# Patient Record
Sex: MALE | Race: WHITE | HISPANIC OR LATINO | Employment: STUDENT | URBAN - METROPOLITAN AREA
[De-identification: names, ages, dates, MRNs, and addresses within clinical notes are randomized per-mention and may not be internally consistent; named-entity substitution may affect disease eponyms.]

---
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
## 2022-02-14 ENCOUNTER — OFFICE VISIT (OUTPATIENT)
Dept: INTERNAL MEDICINE | Facility: CLINIC | Age: 22
End: 2022-02-14
Payer: COMMERCIAL

## 2022-02-14 ENCOUNTER — LAB VISIT (OUTPATIENT)
Dept: LAB | Facility: HOSPITAL | Age: 22
End: 2022-02-14
Attending: INTERNAL MEDICINE
Payer: COMMERCIAL

## 2022-02-14 VITALS
SYSTOLIC BLOOD PRESSURE: 123 MMHG | BODY MASS INDEX: 20.54 KG/M2 | WEIGHT: 115.94 LBS | HEIGHT: 63 IN | OXYGEN SATURATION: 99 % | HEART RATE: 78 BPM | DIASTOLIC BLOOD PRESSURE: 69 MMHG

## 2022-02-14 DIAGNOSIS — M25.60 JOINT STIFFNESS: ICD-10-CM

## 2022-02-14 DIAGNOSIS — M79.18 MYOFASCIAL PAIN SYNDROME, CERVICAL: Primary | ICD-10-CM

## 2022-02-14 LAB
ALBUMIN SERPL BCP-MCNC: 5.3 G/DL (ref 3.5–5.2)
ALP SERPL-CCNC: 87 U/L (ref 55–135)
ALT SERPL W/O P-5'-P-CCNC: 22 U/L (ref 10–44)
ANION GAP SERPL CALC-SCNC: 11 MMOL/L (ref 8–16)
AST SERPL-CCNC: 26 U/L (ref 10–40)
BASOPHILS # BLD AUTO: 0.04 K/UL (ref 0–0.2)
BASOPHILS NFR BLD: 0.5 % (ref 0–1.9)
BILIRUB SERPL-MCNC: 0.8 MG/DL (ref 0.1–1)
BILIRUB UR QL STRIP: NEGATIVE
BUN SERPL-MCNC: 12 MG/DL (ref 6–20)
CALCIUM SERPL-MCNC: 10.5 MG/DL (ref 8.7–10.5)
CCP AB SER IA-ACNC: <0.5 U/ML
CHLORIDE SERPL-SCNC: 102 MMOL/L (ref 95–110)
CLARITY UR REFRACT.AUTO: CLEAR
CO2 SERPL-SCNC: 28 MMOL/L (ref 23–29)
COLOR UR AUTO: COLORLESS
CREAT SERPL-MCNC: 0.9 MG/DL (ref 0.5–1.4)
CREAT UR-MCNC: 11 MG/DL (ref 23–375)
CRP SERPL-MCNC: <0.3 MG/L (ref 0–8.2)
DIFFERENTIAL METHOD: NORMAL
EOSINOPHIL # BLD AUTO: 0.2 K/UL (ref 0–0.5)
EOSINOPHIL NFR BLD: 2 % (ref 0–8)
ERYTHROCYTE [DISTWIDTH] IN BLOOD BY AUTOMATED COUNT: 13.1 % (ref 11.5–14.5)
ERYTHROCYTE [SEDIMENTATION RATE] IN BLOOD BY WESTERGREN METHOD: <2 MM/HR (ref 0–23)
EST. GFR  (AFRICAN AMERICAN): >60 ML/MIN/1.73 M^2
EST. GFR  (NON AFRICAN AMERICAN): >60 ML/MIN/1.73 M^2
GLUCOSE SERPL-MCNC: 81 MG/DL (ref 70–110)
GLUCOSE UR QL STRIP: NEGATIVE
HCT VFR BLD AUTO: 47.8 % (ref 40–54)
HGB BLD-MCNC: 15.5 G/DL (ref 14–18)
HGB UR QL STRIP: NEGATIVE
IMM GRANULOCYTES # BLD AUTO: 0.01 K/UL (ref 0–0.04)
IMM GRANULOCYTES NFR BLD AUTO: 0.1 % (ref 0–0.5)
KETONES UR QL STRIP: NEGATIVE
LEUKOCYTE ESTERASE UR QL STRIP: NEGATIVE
LYMPHOCYTES # BLD AUTO: 2.9 K/UL (ref 1–4.8)
LYMPHOCYTES NFR BLD: 36.5 % (ref 18–48)
MCH RBC QN AUTO: 28.8 PG (ref 27–31)
MCHC RBC AUTO-ENTMCNC: 32.4 G/DL (ref 32–36)
MCV RBC AUTO: 89 FL (ref 82–98)
MONOCYTES # BLD AUTO: 0.5 K/UL (ref 0.3–1)
MONOCYTES NFR BLD: 5.9 % (ref 4–15)
NEUTROPHILS # BLD AUTO: 4.3 K/UL (ref 1.8–7.7)
NEUTROPHILS NFR BLD: 55 % (ref 38–73)
NITRITE UR QL STRIP: NEGATIVE
NRBC BLD-RTO: 0 /100 WBC
PH UR STRIP: 7 [PH] (ref 5–8)
PLATELET # BLD AUTO: 262 K/UL (ref 150–450)
PMV BLD AUTO: 11 FL (ref 9.2–12.9)
POTASSIUM SERPL-SCNC: 4.1 MMOL/L (ref 3.5–5.1)
PROT SERPL-MCNC: 8.3 G/DL (ref 6–8.4)
PROT UR QL STRIP: NEGATIVE
PROT UR-MCNC: <7 MG/DL (ref 0–15)
PROT/CREAT UR: ABNORMAL MG/G{CREAT} (ref 0–0.2)
RBC # BLD AUTO: 5.39 M/UL (ref 4.6–6.2)
RHEUMATOID FACT SERPL-ACNC: <13 IU/ML (ref 0–15)
SODIUM SERPL-SCNC: 141 MMOL/L (ref 136–145)
SP GR UR STRIP: 1 (ref 1–1.03)
URN SPEC COLLECT METH UR: ABNORMAL
WBC # BLD AUTO: 7.81 K/UL (ref 3.9–12.7)

## 2022-02-14 PROCEDURE — 3074F PR MOST RECENT SYSTOLIC BLOOD PRESSURE < 130 MM HG: ICD-10-PCS | Mod: CPTII,S$GLB,, | Performed by: INTERNAL MEDICINE

## 2022-02-14 PROCEDURE — 80053 COMPREHEN METABOLIC PANEL: CPT | Performed by: INTERNAL MEDICINE

## 2022-02-14 PROCEDURE — 3078F DIAST BP <80 MM HG: CPT | Mod: CPTII,S$GLB,, | Performed by: INTERNAL MEDICINE

## 2022-02-14 PROCEDURE — 1160F PR REVIEW ALL MEDS BY PRESCRIBER/CLIN PHARMACIST DOCUMENTED: ICD-10-PCS | Mod: CPTII,S$GLB,, | Performed by: INTERNAL MEDICINE

## 2022-02-14 PROCEDURE — 3008F BODY MASS INDEX DOCD: CPT | Mod: CPTII,S$GLB,, | Performed by: INTERNAL MEDICINE

## 2022-02-14 PROCEDURE — 1159F MED LIST DOCD IN RCRD: CPT | Mod: CPTII,S$GLB,, | Performed by: INTERNAL MEDICINE

## 2022-02-14 PROCEDURE — 86140 C-REACTIVE PROTEIN: CPT | Performed by: INTERNAL MEDICINE

## 2022-02-14 PROCEDURE — 3078F PR MOST RECENT DIASTOLIC BLOOD PRESSURE < 80 MM HG: ICD-10-PCS | Mod: CPTII,S$GLB,, | Performed by: INTERNAL MEDICINE

## 2022-02-14 PROCEDURE — 84156 ASSAY OF PROTEIN URINE: CPT | Performed by: INTERNAL MEDICINE

## 2022-02-14 PROCEDURE — 86038 ANTINUCLEAR ANTIBODIES: CPT | Performed by: INTERNAL MEDICINE

## 2022-02-14 PROCEDURE — 3008F PR BODY MASS INDEX (BMI) DOCUMENTED: ICD-10-PCS | Mod: CPTII,S$GLB,, | Performed by: INTERNAL MEDICINE

## 2022-02-14 PROCEDURE — 99214 OFFICE O/P EST MOD 30 MIN: CPT | Mod: S$GLB,,, | Performed by: INTERNAL MEDICINE

## 2022-02-14 PROCEDURE — 99999 PR PBB SHADOW E&M-EST. PATIENT-LVL III: ICD-10-PCS | Mod: PBBFAC,,, | Performed by: INTERNAL MEDICINE

## 2022-02-14 PROCEDURE — 85025 COMPLETE CBC W/AUTO DIFF WBC: CPT | Performed by: INTERNAL MEDICINE

## 2022-02-14 PROCEDURE — 99214 PR OFFICE/OUTPT VISIT, EST, LEVL IV, 30-39 MIN: ICD-10-PCS | Mod: S$GLB,,, | Performed by: INTERNAL MEDICINE

## 2022-02-14 PROCEDURE — 85652 RBC SED RATE AUTOMATED: CPT | Performed by: INTERNAL MEDICINE

## 2022-02-14 PROCEDURE — 36415 COLL VENOUS BLD VENIPUNCTURE: CPT | Performed by: INTERNAL MEDICINE

## 2022-02-14 PROCEDURE — 81003 URINALYSIS AUTO W/O SCOPE: CPT | Performed by: INTERNAL MEDICINE

## 2022-02-14 PROCEDURE — 86803 HEPATITIS C AB TEST: CPT | Performed by: INTERNAL MEDICINE

## 2022-02-14 PROCEDURE — 1160F RVW MEDS BY RX/DR IN RCRD: CPT | Mod: CPTII,S$GLB,, | Performed by: INTERNAL MEDICINE

## 2022-02-14 PROCEDURE — 86200 CCP ANTIBODY: CPT | Performed by: INTERNAL MEDICINE

## 2022-02-14 PROCEDURE — 86431 RHEUMATOID FACTOR QUANT: CPT | Performed by: INTERNAL MEDICINE

## 2022-02-14 PROCEDURE — 86235 NUCLEAR ANTIGEN ANTIBODY: CPT | Performed by: INTERNAL MEDICINE

## 2022-02-14 PROCEDURE — 3074F SYST BP LT 130 MM HG: CPT | Mod: CPTII,S$GLB,, | Performed by: INTERNAL MEDICINE

## 2022-02-14 PROCEDURE — 99999 PR PBB SHADOW E&M-EST. PATIENT-LVL III: CPT | Mod: PBBFAC,,, | Performed by: INTERNAL MEDICINE

## 2022-02-14 PROCEDURE — 1159F PR MEDICATION LIST DOCUMENTED IN MEDICAL RECORD: ICD-10-PCS | Mod: CPTII,S$GLB,, | Performed by: INTERNAL MEDICINE

## 2022-02-14 NOTE — PROGRESS NOTES
CHIEF COMPLAINT     Chief Complaint   Patient presents with    Joint Pain       HPI     Blayne Dai is a 21 y.o. male history bipolar 1 mixed here today for follow-up joint stiffness and pain.    Patient was seen by me back in November reports some myofascial neck pain.  Patient was given home exercises.  Since then reports pain is persisted.  Also notices pain and stiffness in other joints.  Notices that the stiffness is worse 1st thing in the morning and loosens up within half an hour.  Has also noticed some improvement with NSAIDs.    Has had symptoms every single day since he saw me last.  No symptoms like this in the past.    Denies any hair loss scalp scalp tenderness, jaw claudication rashes oral lesions nausea vomiting diarrhea    Answers for HPI/ROS submitted by the patient on 2/11/2022  Chronicity: recurrent  Onset: more than 1 year ago  Frequency: 2 to 4 times per day  Progression since onset: waxing and waning  Pain location: lumbar spine, sacro-iliac  Pain quality: aching, shooting  Radiates to: does not radiate  Pain - numeric: 6/10  Pain is: worse during the day  Aggravated by: bending, sitting, stress, twisting  Stiffness is present: all day  bladder incontinence: No  bowel incontinence: No  leg pain: No  paresis: No  paresthesias: No  pelvic pain: Yes  perianal numbness: No  tingling: No  weight loss: No  genital pain: No  Pain severity: moderate  Improvement on treatment: no relief    Personally Reviewed Patient's Medical, surgical, family and social hx. Changes updated in [facility].  Care Team updated in Epic    Review of Systems:  Review of Systems   Constitutional: Negative for fever.   Cardiovascular: Negative for chest pain.   Gastrointestinal: Positive for abdominal pain.   Genitourinary: Negative for dysuria and hematuria.   Musculoskeletal: Positive for back pain.   Neurological: Positive for weakness. Negative for numbness and headaches.       Health Maintenance:   Reviewed with  "patient  Due for the following:      PHYSICAL EXAM     /69 (BP Location: Right arm, Patient Position: Sitting, BP Method: Medium (Automatic))   Pulse 78   Ht 5' 3" (1.6 m)   Wt 52.6 kg (115 lb 15.4 oz)   SpO2 99%   BMI 20.54 kg/m²     Gen: Well Appearing, NAD  HEENT: PERR, EOMI  Neck: FROM, no thyromegaly, no cervical adenopathy  CVD: RRR, no M/R/G  Pulm: Normal work of breathing, CTAB, no wheezing  Abd:  Soft, NT, ND non TTP, no mass  MSK: no LE edema  Tender palpation cervical muscles  Joint tenderness not reproducible on exam  Neuro: A&Ox3, gait normal, speech normal  Mood; Mood normal, behavior normal, thought process linear       LABS     Labs reviewed; Notable for    ASSESSMENT     1. Myofascial pain syndrome, cervical  Ambulatory referral/consult to Physical/Occupational Therapy   2. Joint stiffness  CBC auto differential    Comprehensive metabolic panel    Sedimentation rate    C-reactive protein    URSZULA    Rheumatoid factor    Cyclic citrul peptide antibody, IgG    Sjogrens syndrome-A extractable nuclear antibody    Urinalysis    Protein / creatinine ratio, urine    Hepatitis C antibody           Plan     Blayne Dai is a 21 y.o. male with history of bipolar 1 mixed here for 2nd visit for muscle pain and joint stiffness.  Correa bleed secondary workup for autoimmune causes.  Will also send to physical therapy.  For physical treatment.  Can take NSAIDs as needed for symptomatic relief.  1. Myofascial pain syndrome, cervical  - Ambulatory referral/consult to Physical/Occupational Therapy; Future    2. Joint stiffness  - CBC auto differential; Future  - Comprehensive metabolic panel; Future  - Sedimentation rate; Future  - C-reactive protein; Future  - URSZULA; Future  - Rheumatoid factor; Future  - Cyclic citrul peptide antibody, IgG; Future  - Sjogrens syndrome-A extractable nuclear antibody; Future  - Urinalysis  - Protein / creatinine ratio, urine  - Hepatitis C antibody; Future      Pietro CLARKE" MD [facility]

## 2022-02-14 NOTE — LETTER
February 14, 2022    Blayne Dai  [address] [facility] [address]             [facility]  [address]  [facility] [address]  Phone: [phone]  Fax: [phone] To Whom This May Concern,    Mr. Blayne Dai was seen and assessed by me on 2/14/2022    Please excuse his from school on 2/14/2022. he may return to school on 02/14/2022    Sincerely,        Pietro Stinson MD  Internal Medicine

## 2022-02-15 LAB
ANA SER QL IF: NORMAL
ANTI-SSA ANTIBODY: 0.07 RATIO (ref 0–0.99)
ANTI-SSA INTERPRETATION: NEGATIVE

## 2022-02-16 LAB — HCV AB SERPL QL IA: NEGATIVE

## 2022-03-09 ENCOUNTER — CLINICAL SUPPORT (OUTPATIENT)
Dept: REHABILITATION | Facility: OTHER | Age: 22
End: 2022-03-09
Attending: INTERNAL MEDICINE
Payer: COMMERCIAL

## 2022-03-09 DIAGNOSIS — G89.29 CHRONIC NECK PAIN: ICD-10-CM

## 2022-03-09 DIAGNOSIS — M79.18 MYOFASCIAL PAIN SYNDROME, CERVICAL: ICD-10-CM

## 2022-03-09 DIAGNOSIS — M54.2 CHRONIC NECK PAIN: ICD-10-CM

## 2022-03-09 DIAGNOSIS — M62.81 MUSCLE WEAKNESS OF RIGHT UPPER EXTREMITY: ICD-10-CM

## 2022-03-09 DIAGNOSIS — R29.3 POSTURAL IMBALANCE: ICD-10-CM

## 2022-03-09 PROCEDURE — 97161 PT EVAL LOW COMPLEX 20 MIN: CPT | Mod: PN

## 2022-03-09 NOTE — PATIENT INSTRUCTIONS
TOWEL ROLL PEC STRETCH          Lie down on a towel roll and hold one of your wrists as it rests on your stomach.     Next, allow your shoulders to lower toward the floor as shown.    Copyright © 2022 [employer]      PRONE RETRACTION        Lying face down with your arms by your side, slowly squeeze your shoulder blades downward and towards your spine.     Hold for  3  Seconds. Perform  20  reps    Copyright © 2022 [employer]    PRONE RETRACTION EXTENSION - PRONE I          Lying face down with your arms by your side, slowly move your arms upward towards the ceiling as you squeeze your shoulder blades downwards and towards your spine.     Hold for  3  Seconds. Perform 2 sets of  10  reps    Copyright © 2022 [employer]    PRONE W        Lying face down with your elbows bent and palms facing downward, slowly raise your arms up towards the ceiling as you squeeze your shoulder blades downward and towards your spine.     Hold for  3  Seconds. Perform 2 sets of  10  reps    Copyright © 2022 [employer]      PRONE Y        Lying face down with your arms stretched out upwards as shown, slowly move your arms upward towards the ceiling as you squeeze your shoulder blades downward and towards your spine.     Hold for  3  Seconds. Perform 2 sets of  10  reps    Copyright © 2022 [employer]      Prone Ts        Start Position: arms out to your sides (like an airplane wing) Palms facing down.    End position: squeeze your shoulder blades together and raise your arms up     Hold for  3  Seconds. Perform 2 sets of  10  Reps.    Copyright © 2022 [employer]

## 2022-03-10 PROBLEM — R29.3 POSTURAL IMBALANCE: Status: ACTIVE | Noted: 2022-03-10

## 2022-03-10 PROBLEM — M62.81 MUSCLE WEAKNESS OF RIGHT UPPER EXTREMITY: Status: ACTIVE | Noted: 2022-03-10

## 2022-03-10 PROBLEM — G89.29 CHRONIC NECK PAIN: Status: ACTIVE | Noted: 2022-03-10

## 2022-03-10 PROBLEM — M54.2 CHRONIC NECK PAIN: Status: ACTIVE | Noted: 2022-03-10

## 2022-03-10 NOTE — PLAN OF CARE
[facility]  Physical Therapy Initial Evaluation    Name: Blayne Dai  Clinic Number: [id number]    Therapy Diagnosis:   Encounter Diagnoses   Name Primary?    Myofascial pain syndrome, cervical     Chronic neck pain     Postural imbalance     Muscle weakness of right upper extremity      Physician: Pietro Stinson MD    Physician Orders: PT Eval and Treat   Medical Diagnosis: M79.18 (ICD-10-CM) - Myofascial pain syndrome, cervical  Evaluation Date: 3/9/2022  Authorization Period Expiration: 2/14/2023  Plan of Care Certification Period: 5/4/2022  Visit # / Visits authorized: 1/ 1    Time In: 4:20 PM  Time Out: 4:58 PM  Total Billable Time: 13 minutes    Precautions: Standard      Subjective   Date of onset: September 2021  History of current condition - Asad reports: having generalized body stiffness and pain, predominantly in his neck, but also along his back and R knee. Is a student at [employer]. Reports when he does his homework, he slouches at a coffee table.       Past Medical History:   Diagnosis Date    Congenital velopharyngeal insufficiency     Growth hormone deficiency     Otitis media     Submucous cleft palate      Blayne Dai  has a past surgical history that includes Tympanostomy tube placement; polyp removal; submucous cleft palate; Pharyngeal flap; and Coccyx fracture surgery (2017).    Blayne has a current medication list which includes the following prescription(s): aripiprazole.    Review of patient's allergies indicates:  No Known Allergies     Imaging: no recent imaging in EPIC    Prior Therapy: none  Social History: lives with their family  Occupation: student. [employer]  Prior Level of Function: I with amb and ADL  Current Level of Function: I with amb and ADL    Pain:  Current 4/10, worst 7/10, best 2/10   Location: bilateral neck. L > R   Description: stiff, electric, and stabbing  Aggravating Factors: sitting, turning his head.  Easing Factors:  standing, Ibuprofen    Radicular symptoms: none    Sleep is not disturbed. Sleeping position: back    Patients structured exercise routine:  Has stopped weight training recently  Exercise routine prior to onset: weight training    Pts goals: increase flexibility, decrease pain    Objective     Postural examination in standing:  - normal lumbar lordosis  - forward head  - forward shoulders  - increased thoracic kyphosis  - protracted scapulae    Postural examination in sitting:   - decreased lumbar lordosis  - forward head  - forward shoulders  - increased thoracic kyphosis  - protracted scapulae      Functional assessment:   - walking: I  - sit to stand: I  - sit to supine: I       - supine to sit: I  - supine to prone: I    Cervical AROM    flexion 35 deg   extension 30 deg   R rotation 60 deg   L rotation 40 deg   R side bending 20 deg   L side bending 10 deg       Cervical Special Tests    Compression negative   Distraction negative   Alar Ligament Stress Test: negative   Sharp-Nishant Test negative   Spurling's:    negative       UE MMT R L   C3 Cervical side bend 5/5 5/5   C4 Shoulder Shrug 5/5  5/5   Shoulder flexion 5/5 5/5   Shoulder abduction 5/5 5/5   Shoulder IR 5/5 5/5   Shoulder ER 5/5 5/5   C5 Elbow flexion 5/5 5/5   C7 Elbow extension 5/5 5/5   C6 Wrist extension 5/5 5/5   Wrist flexion 5/5 5/5   Scapular protraction 5/5 5/5   Mid Traps 4-/5 4-/5   Lower traps 3-/5 3-/5     AROM  LE MMT  R  L    Hip flexion  5/5  5/5    Hip abduction  4-/5  4-/5    Hip extension  3+/5  4/5    Hip ER  5/5  5/5    Hip IR  3+/5  5/5    Knee extension  5/5  5/5    Knee flexion  5/5  5/5    Ankle dorsiflexion  5/5  5/5    Ankle plantar flexion  5/5  5/5    Ankle inversion  5/5  5/5    Ankle eversion  5/5  5/5        Flexibility testing:  - hamstrings:           B: tight. R: -40 deg, L: -40 deg  - gastrocnemius:     B tight, R: neutral, L: neutral   - piriformis:              B: tight, R: decreased 25%, L: decreased 50%  -  "quadriceps:           B: WNL  - hip adductors:       B: WNL  - hip flexors:             B: WNL  - IT bands:               R: tight, decreased 25%, L: WNL    Joint Mobility:  R knee AROM: 0 to 135 deg  L knee AROM: 0 to 140 deg      CMS Impairment/Limitation/Restriction for FOTO Neck Survey    Therapist reviewed FOTO scores for Blayne Dai on 3/9/2022.   FOTO documents entered into "[employer]" - see Media section.    Limitation Score: 45%  Category: Body Position    Current : CK = at least 40% but < 60% impaired, limited or restricted  Goal: CJ = at least 20% but < 40% impaired, limited or restricted       TREATMENT   Treatment Time In: 4:45 pm  Treatment Time Out: 4:58 pm  Total Treatment time separate from Evaluation time: 13 minutes    Asad received therapeutic exercises to develop strength, ROM, flexibility and posture for 13 minutes including:  Prone scap retractions 20 x 5"  Prone Ys 10 x 3"  Prone Ws 10 x 3"  Prone Ts 10 x 3"  Prone Is 10 x 3"  pec stretch on towel roll x 2'      Home Exercises Provided and Patient Education Provided     Education provided:   - Discussed the role of the PTA on the Rehab Team. Discussed patient will be seen by a physical therapist minimally every 6th visit or every 30 days prior to being seen by PTA. Also discussed the use of the [facility] for communication.    Prone scap retract  Prone Ys  Prone Ws  Prone Ts  Prone Is  pec stretch on towel roll    Written Home Exercises Provided: yes.  Exercises were reviewed and Asad was able to demonstrate them prior to the end of the session.  Asad demonstrated good  understanding of the education provided.     See EMR under Patient Instructions for exercises provided 3/9/2022.    Assessment   Blayne is a 21 y.o. male referred to outpatient Physical Therapy with a medical diagnosis of M79.18 (ICD-10-CM) - Myofascial pain syndrome, cervical. Pt presents with poor posture, postural imbalance, weak scapular stabilizers, decreased " B UE strength, decreased B LE strength and flexibility, and weak core contributing to increased upper back and neck pain. Patient also experiencing R knee pain. Patient will benefit from OP PT for postural reeducation, core strengthening, scapular stabilization, and neck stretches and strengthening to progress to below listed goals. WIll initially begin once a week secondary schooling, but may be able to continue twice a week.    Pt prognosis is Good.   Pt will benefit from skilled outpatient Physical Therapy to address the deficits stated above and in the chart below, provide pt/family education, and to maximize pt's level of independence.     Plan of care discussed with patient: Yes  Pt's spiritual, cultural and educational needs considered and patient is agreeable to the plan of care and goals as stated below:     Anticipated Barriers for therapy: school schedule    Medical Necessity is demonstrated by the following  History  Co-morbidities and personal factors that may impact the plan of care Co-morbidities:   none    Personal Factors:   no deficits     low   Examination  Body Structures and Functions, activity limitations and participation restrictions that may impact the plan of care Body Regions:   neck  back  lower extremities  upper extremities    Body Systems:    strength    Participation Restrictions:   none    Activity limitations:   Learning and applying knowledge  no deficits    General Tasks and Commands  no deficits    Communication  no deficits    Mobility  no deficits    Self care  no deficits    Domestic Life  no deficits    Interactions/Relationships  no deficits    Life Areas  no deficits    Community and Social Life  no deficits         moderate   Clinical Presentation stable and uncomplicated low   Decision Making/ Complexity Score: low     Goals:  Short Term Goals: 4 weeks   1. Independent with HEP.  2. Report decreased cervical pain < or =  5/10 with adls such as prolonged sitting, turning  his head.  3. Increased MMT for B UE by 1 muscle grade to promote proper scapular stabilization to decrease cervical pain < or =  5/10 with adls such as prolonged sitting, turning his head.    Long Term Goals: 8 weeks   4. Report decreased cervical pain < or =  3/10 with adls such as prolonged sitting, turning his head.  5. Increased MMT for B UE by 1 muscle grade to promote proper scapular stabilization to decrease cervical pain < or =  3/10 with adls such as prolonged sitting, turning his head.  6. Patient to achieve CJ (at least 20% < 40% impaired, limited or restricted) level on the FOTO Outcomes Measurement System.    Plan   Certification Period/Plan of care expiration: 3/9/2022 to 5/4/2022.    Outpatient Physical Therapy 2 times weekly for 8 weeks to include the following interventions: Manual Therapy, Moist Heat/ Ice, Neuromuscular Re-ed, Patient Education, Therapeutic Activities and Therapeutic Exercise.     Micah Martinez, PT

## 2022-03-18 ENCOUNTER — CLINICAL SUPPORT (OUTPATIENT)
Dept: REHABILITATION | Facility: OTHER | Age: 22
End: 2022-03-18
Payer: COMMERCIAL

## 2022-03-18 DIAGNOSIS — R29.3 POSTURAL IMBALANCE: ICD-10-CM

## 2022-03-18 DIAGNOSIS — M62.81 MUSCLE WEAKNESS OF RIGHT UPPER EXTREMITY: ICD-10-CM

## 2022-03-18 DIAGNOSIS — G89.29 CHRONIC NECK PAIN: Primary | ICD-10-CM

## 2022-03-18 DIAGNOSIS — M54.2 CHRONIC NECK PAIN: Primary | ICD-10-CM

## 2022-03-18 PROCEDURE — 97110 THERAPEUTIC EXERCISES: CPT | Mod: PN,CQ

## 2022-03-18 PROCEDURE — 97140 MANUAL THERAPY 1/> REGIONS: CPT | Mod: PN,CQ

## 2022-03-18 NOTE — PROGRESS NOTES
"                            Physical Therapy Daily Treatment Note     Name: Blayne Dai  Clinic Number: [id number]    Therapy Diagnosis:   Encounter Diagnoses   Name Primary?    Chronic neck pain Yes    Postural imbalance     Muscle weakness of right upper extremity      Physician: Pietro Stinson MD    Visit Date: 3/18/2022  Physician Orders: PT Eval and Treat   Medical Diagnosis: M79.18 (ICD-10-CM) - Myofascial pain syndrome, cervical  Evaluation Date: 3/9/2022  Authorization Period Expiration: 2/14/2023  Plan of Care Certification Period: 5/4/2022  Visit # / Visits authorized: 2 (1/ 20)   PTA Visit:  1/5  FOTO: 3/9/2022  1/5  Time In: 0745  Time Out: 0830  Total Billable Time: 45 minutes     Precautions: Standard       Subjective    Pt states cont to have stiffness and pn in cervcial region.  Pt mentioned having increased B knee pn.  Pt reports that knee pn has occurred for about a year.    Pt reports: he was compliant with home exercise program given last session.   Response to previous treatment:no adverse effects  Functional change: no change     Pain: 0/10  Location: bilateral neck      Objective     Asad received therapeutic exercises to develop strength, endurance, ROM, flexibility, posture and core stabilization for 30 minutes including:  Prone scap retractions 30 x 5"  Prone Ys 2 x 10 w/ 3"  Prone Ws 2 x 10 w/ 3"  Prone Ts 2 x 10 w/ 3"  Prone Is 2 x 10 w/ 3"  pec stretch on towel roll x 3'  Low row at table x 15 w/ 3 sec hold ea  Inferior glide seated at table    Asad received the following manual therapy techniques: Manual traction and Myofacial release were applied to the: neck for 15 minutes, including:  Suboccipital release  Manual cervcical traction      Home Exercises Provided and Patient Education Provided     Education provided:   - Pt edu on proper exercise technique.      Written Home Exercises Provided: Patient instructed to cont prior HEP. Exercises were reviewed and Asad was " able to demonstrate them prior to the end of the session.  Asad demonstrated good  understanding of the education provided. See EMR under Patient Instructions for exercises provided during therapy sessions.      Assessment     Pt deandre tx well.  Pn cervical stiffness decreased some after manual treatment.  Pt demonstrated increased muscular endurance during therex.    Asad Is progressing well towards his goals.   Pt prognosis is Good.     Pt will continue to benefit from skilled outpatient physical therapy to address the deficits listed in the problem list box on initial evaluation, provide pt/family education and to maximize pt's level of independence in the home and community environment.     Pt's spiritual, cultural and educational needs considered and pt agreeable to plan of care and goals.    Anticipated barriers to physical therapy: none    Goals: Goals:  Short Term Goals: 4 weeks   1. Independent with HEP. (progressing, not met)   2. Report decreased cervical pain < or =  5/10 with adls such as prolonged sitting, turning his head. (progressing, not met)   3. Increased MMT for B UE by 1 muscle grade to promote proper scapular stabilization to decrease cervical pain < or =  5/10 with adls such as prolonged sitting, turning his head. (progressing, not met)      Long Term Goals: 8 weeks   4. Report decreased cervical pain < or =  3/10 with adls such as prolonged sitting, turning his head. (progressing, not met)   5. Increased MMT for B UE by 1 muscle grade to promote proper scapular stabilization to decrease cervical pain < or =  3/10 with adls such as prolonged sitting, turning his head. (progressing, not met)   6. Patient to achieve CJ (at least 20% < 40% impaired, limited or restricted) level on the FOTO Outcomes Measurement System. (progressing, not met)          Plan     Cont to progress towards goals set by PT.  Work to increase cervical ROM and scap strength.      Saqib Engle, PTA

## 2022-03-22 ENCOUNTER — CLINICAL SUPPORT (OUTPATIENT)
Dept: REHABILITATION | Facility: OTHER | Age: 22
End: 2022-03-22
Payer: COMMERCIAL

## 2022-03-22 DIAGNOSIS — R29.3 POSTURAL IMBALANCE: ICD-10-CM

## 2022-03-22 DIAGNOSIS — M62.81 MUSCLE WEAKNESS OF RIGHT UPPER EXTREMITY: ICD-10-CM

## 2022-03-22 DIAGNOSIS — G89.29 CHRONIC NECK PAIN: Primary | ICD-10-CM

## 2022-03-22 DIAGNOSIS — M54.2 CHRONIC NECK PAIN: Primary | ICD-10-CM

## 2022-03-22 PROCEDURE — 97110 THERAPEUTIC EXERCISES: CPT | Mod: PN,CQ

## 2022-03-22 PROCEDURE — 97140 MANUAL THERAPY 1/> REGIONS: CPT | Mod: PN,CQ

## 2022-03-22 NOTE — PROGRESS NOTES
"                            Physical Therapy Daily Treatment Note     Name: Blayne Dai  Clinic Number: [id number]    Therapy Diagnosis:   Encounter Diagnoses   Name Primary?    Chronic neck pain Yes    Postural imbalance     Muscle weakness of right upper extremity      Physician: Pietro Stinson MD    Visit Date: 3/22/2022  Physician Orders: PT Eval and Treat   Medical Diagnosis: M79.18 (ICD-10-CM) - Myofascial pain syndrome, cervical  Evaluation Date: 3/9/2022  Authorization Period Expiration: 2/14/2023  Plan of Care Certification Period: 5/4/2022  Visit # / Visits authorized: 3 (2/ 20)   PTA Visit:  2/5  FOTO: 3/9/2022  2/5  Time In: 0748  Time Out: 0828  Total Billable Time: 40 minutes     Precautions: Standard       Subjective    Pt reports w/ decreased pn in neck but cont to have muscle stiffness.   Pt states he has been performing low row exercise at home since last visit and has had some relief with his symptoms.    Pt reports: he was compliant with home exercise program given last session.   Response to previous treatment:no adverse effects  Functional change: no change     Pain: 0/10  Location: bilateral neck      Objective     Asad received therapeutic exercises to develop strength, endurance, ROM, flexibility, posture and core stabilization for 30 minutes including:  Low row at table x 15 w/ 3 sec hold ea  Inferior glide seated at table x 15 3 sec hold ea   Supine cervical retraction 2 x 10   Supine chin tuck 2 x 10  Supine cervical lateral rotation x 15 ea   pec stretch on towel roll x 3'  Prone Ys 2 x 10 w/ 3"  Prone Ws 2 x 10 w/ 3"  Prone Ts 2 x 10 w/ 3"  Prone Is 2 x 10 w/ 3"    Not performed today:   Prone scap retractions 30 x 5"    Asad received the following manual therapy techniques: Manual traction and Myofacial release were applied to the: neck for 10 minutes, including:  Suboccipital release  Manual cervcical traction      Home Exercises Provided and Patient Education Provided "     Education provided:   - Pt edu on proper exercise technique.      Written Home Exercises Provided: Patient instructed to cont prior HEP. Exercises were reviewed and Asad was able to demonstrate them prior to the end of the session.  Asad demonstrated good  understanding of the education provided. See EMR under Patient Instructions for exercises provided during therapy sessions.      Assessment     Pt demonstrated increased strength and endurance during therex.  Pt responded well to cervical supine activities.  Pt cont to lack some cervical ROM.    Asad Is progressing well towards his goals.   Pt prognosis is Good.     Pt will continue to benefit from skilled outpatient physical therapy to address the deficits listed in the problem list box on initial evaluation, provide pt/family education and to maximize pt's level of independence in the home and community environment.     Pt's spiritual, cultural and educational needs considered and pt agreeable to plan of care and goals.    Anticipated barriers to physical therapy: none    Goals: Goals:  Short Term Goals: 4 weeks   1. Independent with HEP. (progressing, not met)   2. Report decreased cervical pain < or =  5/10 with adls such as prolonged sitting, turning his head. (progressing, not met)   3. Increased MMT for B UE by 1 muscle grade to promote proper scapular stabilization to decrease cervical pain < or =  5/10 with adls such as prolonged sitting, turning his head. (progressing, not met)      Long Term Goals: 8 weeks   4. Report decreased cervical pain < or =  3/10 with adls such as prolonged sitting, turning his head. (progressing, not met)   5. Increased MMT for B UE by 1 muscle grade to promote proper scapular stabilization to decrease cervical pain < or =  3/10 with adls such as prolonged sitting, turning his head. (progressing, not met)   6. Patient to achieve CJ (at least 20% < 40% impaired, limited or restricted) level on the FOTO Outcomes  Measurement System. (progressing, not met)          Plan     Cont to progress towards goals set by PT.  Work to increase cervical ROM and scap strength.      Saqib Engle, PTA

## 2022-04-01 ENCOUNTER — CLINICAL SUPPORT (OUTPATIENT)
Dept: REHABILITATION | Facility: OTHER | Age: 22
End: 2022-04-01
Payer: COMMERCIAL

## 2022-04-01 DIAGNOSIS — M62.81 MUSCLE WEAKNESS OF RIGHT UPPER EXTREMITY: ICD-10-CM

## 2022-04-01 DIAGNOSIS — R29.3 POSTURAL IMBALANCE: ICD-10-CM

## 2022-04-01 DIAGNOSIS — G89.29 CHRONIC NECK PAIN: Primary | ICD-10-CM

## 2022-04-01 DIAGNOSIS — M54.2 CHRONIC NECK PAIN: Primary | ICD-10-CM

## 2022-04-01 PROCEDURE — 97140 MANUAL THERAPY 1/> REGIONS: CPT | Mod: PN,CQ

## 2022-04-01 PROCEDURE — 97110 THERAPEUTIC EXERCISES: CPT | Mod: PN,CQ

## 2022-04-01 NOTE — PROGRESS NOTES
"                            Physical Therapy Daily Treatment Note     Name: Blayne Dai  Clinic Number: [id number]    Therapy Diagnosis:   Encounter Diagnoses   Name Primary?    Chronic neck pain Yes    Postural imbalance     Muscle weakness of right upper extremity      Physician: Pietro Stinson MD    Visit Date: 4/1/2022  Physician Orders: PT Eval and Treat   Medical Diagnosis: M79.18 (ICD-10-CM) - Myofascial pain syndrome, cervical  Evaluation Date: 3/9/2022  Authorization Period Expiration: 2/14/2023  Plan of Care Certification Period: 5/4/2022  Visit # / Visits authorized: 4 (3/ 20)   PTA Visit:  3/5  FOTO: 4/1/2022  0/5  Time In:0747  Time Out: 0828   Total Billable Time: 41 minutes     Precautions: Standard       Subjective    Pt states cont to have neck stiffness.  Pt mentioned having more pn in R knee and limping more.    Pt reports: he was compliant with home exercise program given last session.   Response to previous treatment:no adverse effects  Functional change: no change     Pain: 0/10  Location: bilateral neck      Objective     Asad received therapeutic exercises to develop strength, endurance, ROM, flexibility, posture and core stabilization for 31 minutes including:  pec stretch on towel roll x 3'  UT stretch 3 x 20 sec ea   Low row at table x 15 w/ 5 sec hold ea  Inferior glide seated at table x 15 w/ 5 sec hold ea   Supine cervical retraction 20 x   Supine chin tuck 20 x   Supine cervical lateral rotation x 20 ea   No money otb 3 x 10  Shld rows otb   Pull aparts otb 3 x 10       Not performed today:   Prone scap retractions 30 x 5"  Prone Ys 2 x 10 w/ 3"  Prone Ws 2 x 10 w/ 3"  Prone Ts 2 x 10 w/ 3"  Prone Is 2 x 10 w/ 3"    Asad received the following manual therapy techniques: Manual traction and Myofacial release were applied to the: neck for 10 minutes, including:  Suboccipital release  Manual cervcical traction      Home Exercises Provided and Patient Education Provided "     Education provided:   - Pt edu on proper exercise technique.      Written Home Exercises Provided: Patient instructed to cont prior HEP. Exercises were reviewed and Asad was able to demonstrate them prior to the end of the session.  Asad demonstrated good  understanding of the education provided. See EMR under Patient Instructions for exercises provided during therapy sessions.      Assessment   Pt had no adverse effects from tx.  Pt cont to require some VCs for scap control.  Pt displayed increased strength during therex.      Asad Is progressing well towards his goals.   Pt prognosis is Good.     Pt will continue to benefit from skilled outpatient physical therapy to address the deficits listed in the problem list box on initial evaluation, provide pt/family education and to maximize pt's level of independence in the home and community environment.     Pt's spiritual, cultural and educational needs considered and pt agreeable to plan of care and goals.    Anticipated barriers to physical therapy: none    Goals: Goals:  Short Term Goals: 4 weeks   1. Independent with HEP. (progressing, not met)   2. Report decreased cervical pain < or =  5/10 with adls such as prolonged sitting, turning his head. (progressing, not met)   3. Increased MMT for B UE by 1 muscle grade to promote proper scapular stabilization to decrease cervical pain < or =  5/10 with adls such as prolonged sitting, turning his head. (progressing, not met)      Long Term Goals: 8 weeks   4. Report decreased cervical pain < or =  3/10 with adls such as prolonged sitting, turning his head. (progressing, not met)   5. Increased MMT for B UE by 1 muscle grade to promote proper scapular stabilization to decrease cervical pain < or =  3/10 with adls such as prolonged sitting, turning his head. (progressing, not met)   6. Patient to achieve CJ (at least 20% < 40% impaired, limited or restricted) level on the FOTO Outcomes Measurement System.  (progressing, not met)          Plan     Cont to progress towards goals set by PT.  Work to increase cervical ROM and scap strength.      Saqib Engle, PTA

## 2022-04-07 NOTE — PROGRESS NOTES
"                            Physical Therapy Daily Treatment Note     Name: Blayne Dai  Clinic Number: [id number]    Therapy Diagnosis:   Encounter Diagnoses   Name Primary?    Chronic neck pain Yes    Postural imbalance     Muscle weakness of right upper extremity      Physician: Pietro Stinson MD    Visit Date: 4/8/2022  Physician Orders: PT Eval and Treat   Medical Diagnosis: M79.18 (ICD-10-CM) - Myofascial pain syndrome, cervical  Evaluation Date: 3/9/2022  Authorization Period Expiration: 2/14/2023  Plan of Care Certification Period: 5/4/2022  Visit # / Visits authorized: 5 (4/ 20)  PTA Visit:  0/5  FOTO: 4/1/2022 2/5      Time In: 7:50 am  Time Out: 8:30 am   Total Billable Time: 40 minutes     Precautions: Standard       Subjective    Pt reports stiffness in his neck more  than pain. States the stiffness stays with him throughout the day.      Pt reports: he was compliant with home exercise program given last session.   Response to previous treatment: good  Functional change: no change     Pain: 6/10 stiffness. 0/10 pain  Location: bilateral neck      Objective     Asad received therapeutic exercises to develop strength, endurance, ROM, flexibility, posture and core stabilization for 40 minutes including:  pec stretch on towel roll x 3'  Supine chin tuck 20 x   Elastic band cervical extension 20 x 5"  Elastic band cervical side bending 20 x 5"    UT stretch 3 x 20 sec ea   Levator scapula stretch 3 x 30"    No money gtb 3 x 10  Pull aparts gtb 3 x 10     Lower trap lift off off wall, ytb at wrist 2 x 10 reps    Shld rows otb     Low row at table x 15 w/ 5 sec hold ea  Inferior glide seated at table x 15 w/ 5 sec hold ea   Supine cervical retraction 20 x     Supine cervical lateral rotation x 20 ea         Not performed today:   Prone scap retractions 30 x 5"  Prone Ys 2 x 10 w/ 3"  Prone Ws 2 x 10 w/ 3"  Prone Ts 2 x 10 w/ 3"  Prone Is 2 x 10 w/ 3"    Asad received the following manual therapy " techniques: Manual traction and Myofacial release were applied to the: neck for 00 minutes, including:  Suboccipital release  Manual cervcical traction      Home Exercises Provided and Patient Education Provided     Education provided:   Elastic band cervical extension  Elastic band cervical side bending  Upper trap stretch  Levator scapula stretch    Written Home Exercises Provided: yes. Exercises were reviewed and Asad was able to demonstrate them prior to the end of the session.  Asad demonstrated good  understanding of the education provided. See EMR under Patient Instructions for exercises provided during therapy sessions.      Assessment   Added cervical stretches and strengthening exercises to HEP today. Continued with postural reeducation and scapular stabilization.      Asad Is progressing well towards his goals.   Pt prognosis is Good.     Pt will continue to benefit from skilled outpatient physical therapy to address the deficits listed in the problem list box on initial evaluation, provide pt/family education and to maximize pt's level of independence in the home and community environment.     Pt's spiritual, cultural and educational needs considered and pt agreeable to plan of care and goals.    Anticipated barriers to physical therapy: none    Goals: Goals:  Short Term Goals: 4 weeks   1. Independent with HEP. (progressing, not met)   2. Report decreased cervical pain < or =  5/10 with adls such as prolonged sitting, turning his head. (progressing, not met)   3. Increased MMT for B UE by 1 muscle grade to promote proper scapular stabilization to decrease cervical pain < or =  5/10 with adls such as prolonged sitting, turning his head. (progressing, not met)      Long Term Goals: 8 weeks   4. Report decreased cervical pain < or =  3/10 with adls such as prolonged sitting, turning his head. (progressing, not met)   5. Increased MMT for B UE by 1 muscle grade to promote proper scapular  stabilization to decrease cervical pain < or =  3/10 with adls such as prolonged sitting, turning his head. (progressing, not met)   6. Patient to achieve CJ (at least 20% < 40% impaired, limited or restricted) level on the FOTO Outcomes Measurement System. (progressing, not met)          Plan     Cont to progress towards goals set by PT.  Work to increase cervical ROM and scap strength.      Micah Martinez, PT

## 2022-04-08 ENCOUNTER — CLINICAL SUPPORT (OUTPATIENT)
Dept: REHABILITATION | Facility: OTHER | Age: 22
End: 2022-04-08
Payer: COMMERCIAL

## 2022-04-08 DIAGNOSIS — M54.2 CHRONIC NECK PAIN: Primary | ICD-10-CM

## 2022-04-08 DIAGNOSIS — R29.3 POSTURAL IMBALANCE: ICD-10-CM

## 2022-04-08 DIAGNOSIS — M62.81 MUSCLE WEAKNESS OF RIGHT UPPER EXTREMITY: ICD-10-CM

## 2022-04-08 DIAGNOSIS — G89.29 CHRONIC NECK PAIN: Primary | ICD-10-CM

## 2022-04-08 PROCEDURE — 97110 THERAPEUTIC EXERCISES: CPT | Mod: PN

## 2022-04-08 NOTE — PATIENT INSTRUCTIONS
ELASTIC BAND RETRACTION 1          Start with an elastic band wrapped around the back of your head as shown. Then, move your head back and into the resistance of the band  into a chin tuck position as shown.     Hold for 5 seconds, perform 20 reps.    Copyright © 2022 [employer]    ELASTIC BAND CERVICAL LATERAL FLEXION          Wrap an elastic band around your head and hold the ends as shown.     Start with your head in a straight position. While holding the ends of the band still, tilt your head away from that side to laterally bend through the neck and then return your head to a straight position and repeat.     Hold for 5 seconds, perform 20 reps.    Copyright © 2022 [employer]    UPPER TRAP STRETCH - HAND ON HEAD        Begin by retracting your head back into a chin tuck position. Next, move your head towards one side with the help of your hand for light over pressure.     Hold for 30 seconds, perform 3 reps.    Copyright © 2022 [employer]    LEVATOR SCAPULAE STRETCH - HOLDING CHAIR AND TOP OF HEAD        Grab the chair seat and then tilt your head to the other side, then rotate to the side, then tip downward as in looking at your opposite pocket.     Use your other hand and apply over pressure by gentling pulling.     Hold for 30 seconds, perform 3 reps.    Copyright © 2022 [employer]

## 2022-04-19 ENCOUNTER — OFFICE VISIT (OUTPATIENT)
Dept: PSYCHIATRY | Facility: CLINIC | Age: 22
End: 2022-04-19
Payer: COMMERCIAL

## 2022-04-19 DIAGNOSIS — F39 MOOD DISORDER: ICD-10-CM

## 2022-04-19 DIAGNOSIS — F41.1 GENERALIZED ANXIETY DISORDER: ICD-10-CM

## 2022-04-19 DIAGNOSIS — F50.2 BULIMIA NERVOSA: Primary | ICD-10-CM

## 2022-04-19 PROCEDURE — 99215 PR OFFICE/OUTPT VISIT, EST, LEVL V, 40-54 MIN: ICD-10-PCS | Mod: 95,,, | Performed by: NURSE PRACTITIONER

## 2022-04-19 PROCEDURE — 99215 OFFICE O/P EST HI 40 MIN: CPT | Mod: 95,,, | Performed by: NURSE PRACTITIONER

## 2022-04-19 RX ORDER — ARIPIPRAZOLE 5 MG/1
5 TABLET ORAL DAILY
Qty: 30 TABLET | Refills: 1 | Status: SHIPPED | OUTPATIENT
Start: 2022-04-19 | End: 2022-05-16 | Stop reason: SDUPTHER

## 2022-04-19 NOTE — PROGRESS NOTES
"4/19/2022 7:30 AM   Blayne Dai   2000   [id number]                                                                   OUTPATIENT PSYCHIATRY FOLLOW- UP VISIT     Reason for Encounter:  Blayne Dai, a 21 y.o. male,who presents today for follow up of anxiety, depression, hypomanic episodes. Met with patient.     Interval History and Content of Current Session:     Today,  Pt reports doing well overall. Last seen in 5/2021. In senior of high school, finishing soon. 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 Reports hasn't been stressed the last two to three months. Reports "haven't been the best at socializing." Relatively good, did swimming this year.     Reports kept going "on and off" Abilify over the last summer. Reports "I like to be a lone a lot" and to the point "I never talk to anybody unless I have to." Previous friends "just want to party a lot." Considering a minor in  studies. "I feel very focused on academics."    Reports continues to feel "medication is effective and things hit the roof when I don't take it."     Reports has been having joint pains in neck, "really severe joint pains."    Reports has issues with paranoia in the past. Reports "I've been anxious about day-to-day stuff." Reports anxiety "4" out of 10.     Reports some low periods and continuing to have body image issues - discussed increase in Abilify to 5 mg and f/u in one month.      Pt. Continues to deny side effects associated with meds - no AVH, hypomanic or manic episodes, and opts to continue taking daily. Sleeping well, 7 to 8 hours per night.                       Psychosocial stressors: academic, family, social pressures        Review Of Systems:      Medical Review Of Systems:  Pertinent items are noted in HPI.     Psychiatric Review Of Systems:  sleep: yes  appetite changes: no  weight changes: denies  energy/anergy: no  interest/pleasure/anhedonia: no  somatic symptoms: yes, joint " pains  libido: no  anxiety/panic: no  guilty/hopeless: no  S.I.B.s/risky behavior: no  any drugs: no  alcohol: no    Sleep: 7 to 8 hours per night, to bed at 9 pm and awake at 4 am to 5 pm        Risk Parameters:  Patient reports no suicidal ideation  Patient reports no homicidal ideation  Patient reports no self-injurious behavior  Patient reports no violent behavior        Current meds- Abilify     Compliance: yes     Side effects: None        Psychiatric, social, and family history reviewed this visit           PSYCHOTHERAPY ADD-ON [id number]   16-37 minutes     Site: [facility], [address]  Time: 35 minutes  Participants: Met with patient     Therapeutic Intervention Type: insight oriented psychotherapy, behavior modifying psychotherapy  Why chosen therapy is appropriate versus another modality: relevant to diagnosis, patient responds to this modality     Target symptoms: anxiety , eating issues  Primary focus: eating issues  Psychotherapeutic techniques: active listening, motivation interviewing, positive reinforcement, supportive     Outcome monitoring methods: self-report, observation     Patient's response to intervention:  The patient's response to intervention is accepting.     Progress toward goals:  The patient's progress toward goals is good.     Nate VAZ Pregeant        Objective      ALL MEDICATIONS:     Current Outpatient Medications:     ARIPiprazole (ABILIFY) 2 MG Tab, TAKE 1 TABLET(2 MG) BY MOUTH EVERY DAY, Disp: 30 tablet, Rfl: 2    FLUARIX QUAD 1377-5658, PF, 60 mcg (15 mcg x 4)/0.5 mL Syrg, ADM 0.5ML IM UTD, Disp: , Rfl: 0    oxycodone-acetaminophen (PERCOCET) 5-325 mg per tablet, Take 1 tablet by mouth every 6 (six) hours as needed for Pain. (Patient not taking: Reported on 12/16/2019), Disp: 50 tablet, Rfl: 0    somatropin 12 mg (36 unit) Crtg, Inject as directed., Disp: , Rfl:      ALLERGIES:  Review of patient's allergies indicates:  No Known Allergies     RELEVANT  LABS/STUDIES:          Lab Results   Component Value Date     WBC 7.20 01/20/2020     HGB 15.2 01/20/2020     HCT 48.2 (H) 01/20/2020     MCV 88 01/20/2020      01/20/2020      BMP        Lab Results   Component Value Date      01/20/2020     K 4.1 01/20/2020      01/20/2020     CO2 31 (H) 01/20/2020     BUN 13 01/20/2020     CREATININE 0.8 01/20/2020     CALCIUM 10.9 (H) 01/20/2020     ANIONGAP 9 01/20/2020     ESTGFRAFRICA SEE COMMENT 01/20/2020     EGFRNONAA SEE COMMENT 01/20/2020            Lab Results   Component Value Date     ALT 25 01/20/2020     AST 30 01/20/2020     ALKPHOS 116 01/20/2020     BILITOT 0.3 01/20/2020            Lab Results   Component Value Date     TSH 1.594 08/28/2018            Lab Results   Component Value Date     HGBA1C 5.0 01/20/2020         Constitutional  Vitals:  Most recent vital signs, dated less than 90 days prior to this appointment, were reviewed.        Vitals:     05/31/21 1557   BP: (!) 106/51   Pulse: 74   Weight: 50.3 kg (110 lb 14.3 oz)                PHYSICAL EXAM  General: well developed, well nourished  Neurologic:   Gait: Normal   Psychomotor signs:  No involuntary movements or tremor  AIMS: none     PSYCHIATRIC EXAM:     Mental Status Exam:  Appearance: unremarkable, age appropriate  Behavior/Cooperation: normal, cooperative  Speech: normal tone, normal rate, normal pitch, normal volume  Language: uses words appropriately; NO aphasia or dysarthria  Mood: steady  Affect: full, congruent with mood and appropriate to content/situation  Thought Process: normal and logical  Thought Content: normal, no suicidality, no homicidality, delusions, or paranoia  Level of Consciousness: Alert and Oriented x3  Memory:  Intact  Attention/concentration: appropriate for age/education.   Fund of Knowledge: appears adequate  Insight: Intact  Judgment: Intact      Assessment and Diagnosis   Status/Progress: Based on the examination today, the patient's problem(s)  is/are well controlled.  New problems have been presented today.   Co-morbidities and Diagnostic uncertainty are complicating management of the primary condition.  There are no active rule-out diagnoses for this patient at this time.      General Impression:   Mood Disorder NOS versus Bipolar II disorder  CHRIS  Bulimia Nervosa        Intervention/Counseling/Treatment Plan   · Medication Management: -        Increase Abilify to 5 mg by mouth once daily  · Labs: reviewed most recent  · The treatment plan and follow up plan were reviewed with the patient.  · Discussed with patient informed consent, risks vs. benefits, alternative treatments, side effect profile and the inherent unpredictability of individual responses to these treatments. The patient expresses understanding of the above and displays the capacity to agree with this current plan and had no other questions.  · Encouraged Patient to keep future appointments.   · Take medications as prescribed and abstain from substance abuse.   · In the event of an emergency patient was advised to go to the emergency room.     Return to Clinic: 1 month     > than 50% of total time spend on coordination of care and counseling   (which included pts differential diagnosis and prognosis for psychiatric conditions, risks, benefits of treatments, instructions and adherence to treatment plan, risk reduction, reviewing current psychiatric medication regimen, medical problems and social stressors. In addtion to possible discussion with other healthcare provider/s)     Add on Psychotherapy time:0  Total Face time:45 min       The patient location is: [place], at home  The chief complaint leading to consultation is: med f/u    Visit type: audiovisual    Face to Face time with patient: 45 min  60 minutes of total time spent on the encounter, which includes face to face time and non-face to face time preparing to see the patient (eg, review of tests), Obtaining and/or reviewing  separately obtained history, Documenting clinical information in the electronic or other health record, Independently interpreting results (not separately reported) and communicating results to the patient/family/caregiver, or Care coordination (not separately reported).         Each patient to whom he or she provides medical services by telemedicine is:  (1) informed of the relationship between the physician and patient and the respective role of any other health care provider with respect to management of the patient; and (2) notified that he or she may decline to receive medical services by telemedicine and may withdraw from such care at any time.    Notes:      Nate Perdue, MSN, APRN, PMHNP-BC [facility]   4/19/2022 7:30 PM

## 2022-05-16 ENCOUNTER — PATIENT MESSAGE (OUTPATIENT)
Dept: PSYCHIATRY | Facility: CLINIC | Age: 22
End: 2022-05-16
Payer: COMMERCIAL

## 2022-05-16 ENCOUNTER — OFFICE VISIT (OUTPATIENT)
Dept: PSYCHIATRY | Facility: CLINIC | Age: 22
End: 2022-05-16
Payer: COMMERCIAL

## 2022-05-16 DIAGNOSIS — F41.1 GENERALIZED ANXIETY DISORDER: Primary | ICD-10-CM

## 2022-05-16 DIAGNOSIS — F50.2 BULIMIA NERVOSA: ICD-10-CM

## 2022-05-16 DIAGNOSIS — F39 MOOD DISORDER: ICD-10-CM

## 2022-05-16 PROCEDURE — 99214 OFFICE O/P EST MOD 30 MIN: CPT | Mod: 95,,, | Performed by: NURSE PRACTITIONER

## 2022-05-16 PROCEDURE — 99214 PR OFFICE/OUTPT VISIT, EST, LEVL IV, 30-39 MIN: ICD-10-PCS | Mod: 95,,, | Performed by: NURSE PRACTITIONER

## 2022-05-16 RX ORDER — ARIPIPRAZOLE 5 MG/1
5 TABLET ORAL DAILY
Qty: 30 TABLET | Refills: 5 | Status: SHIPPED | OUTPATIENT
Start: 2022-05-16 | End: 2023-01-11 | Stop reason: SDUPTHER

## 2022-05-16 NOTE — PROGRESS NOTES
5/16/2022 7:00 AM   Blayne Dai   2000   [id number]                                                                   OUTPATIENT PSYCHIATRY FOLLOW- UP VISIT     Reason for Encounter:  Blayne Dai, a 21 y.o. male,who presents today for follow up of anxiety, depression, hypomanic episodes. Met with patient.     Interval History and Content of Current Session:     Today,  Pt reports doing well overall. Last seen in 4/2020. Reports since last has been taking medication consistently for the past month. Finished school about two weeks, will be going to [employer] in the fall and will be saving money up during the summer working as  - does some prep, mild anxiety during the work day.     Denies somatic symptoms, reports anxiety levels have been better controlled with some normal anxiety.     Joint pains were improved with physical therapy.     Reports overall doing well.      Pt. Continues to deny side effects associated with meds - no AVH, hypomanic or manic episodes, and opts to continue taking daily. Sleeping well, 7 to 8 hours per night.         Visit ended abruptly today.                   Psychosocial stressors: academic, family, social pressures        Review Of Systems:      Medical Review Of Systems:  Pertinent items are noted in HPI.     Psychiatric Review Of Systems:  Sleep: no  appetite changes: no  weight changes: denies  energy/anergy: no  interest/pleasure/anhedonia: no  somatic symptoms: no  libido: no  anxiety/panic: no  guilty/hopeless: no  S.I.B.s/risky behavior: no  any drugs: no  alcohol: no     Sleep: 7 to 8 hours per night, to bed at 9 pm and awake at 4 am to 5 am        Risk Parameters:  Patient reports no suicidal ideation  Patient reports no homicidal ideation  Patient reports no self-injurious behavior  Patient reports no violent behavior        Current meds- Abilify     Compliance: yes     Side effects: None        Psychiatric, social, and family history reviewed this  visit                Objective      ALL MEDICATIONS:     Current Outpatient Medications:     ARIPiprazole (ABILIFY) 5 MG Tab, TAKE 1 TABLET(2 MG) BY MOUTH EVERY DAY, Disp: 30 tablet, Rfl: 2    FLUARIX QUAD 9935-6530, PF, 60 mcg (15 mcg x 4)/0.5 mL Syrg, ADM 0.5ML IM UTD, Disp: , Rfl: 0    somatropin 12 mg (36 unit) Crtg, Inject as directed., Disp: , Rfl:      ALLERGIES:  Review of patient's allergies indicates:  No Known Allergies     RELEVANT LABS/STUDIES:              Lab Results   Component Value Date     WBC 7.20 01/20/2020     HGB 15.2 01/20/2020     HCT 48.2 (H) 01/20/2020     MCV 88 01/20/2020      01/20/2020      BMP            Lab Results   Component Value Date      01/20/2020     K 4.1 01/20/2020      01/20/2020     CO2 31 (H) 01/20/2020     BUN 13 01/20/2020     CREATININE 0.8 01/20/2020     CALCIUM 10.9 (H) 01/20/2020     ANIONGAP 9 01/20/2020     ESTGFRAFRICA SEE COMMENT 01/20/2020     EGFRNONAA SEE COMMENT 01/20/2020                Lab Results   Component Value Date     ALT 25 01/20/2020     AST 30 01/20/2020     ALKPHOS 116 01/20/2020     BILITOT 0.3 01/20/2020                Lab Results   Component Value Date     TSH 1.594 08/28/2018                Lab Results   Component Value Date     HGBA1C 5.0 01/20/2020         Constitutional  Vitals:  Most recent vital signs, dated less than 90 days prior to this appointment, were reviewed.          Vitals:     02/14/2022 1557   BP: 123/69   Pulse: 78   Weight: 115#                PHYSICAL EXAM  General: well developed, well nourished  Neurologic:   Gait: Normal   Psychomotor signs:  No involuntary movements or tremor  AIMS: none     PSYCHIATRIC EXAM:     Mental Status Exam:  Appearance: unremarkable, age appropriate  Behavior/Cooperation: normal, cooperative  Speech: normal tone, normal rate, normal pitch, normal volume  Language: uses words appropriately; NO aphasia or dysarthria  Mood: steady  Affect: full, congruent with mood and  appropriate to content/situation  Thought Process: normal and logical  Thought Content: normal, no suicidality, no homicidality, delusions, or paranoia  Level of Consciousness: Alert and Oriented x3  Memory:  Intact  Attention/concentration: appropriate for age/education.   Fund of Knowledge: appears adequate  Insight: Intact  Judgment: Intact      Assessment and Diagnosis   Status/Progress: Based on the examination today, the patient's problem(s) is/are well controlled.  New problems have been presented today.   Co-morbidities and Diagnostic uncertainty are complicating management of the primary condition.  There are no active rule-out diagnoses for this patient at this time.      General Impression:   Mood Disorder NOS versus Bipolar II disorder  CHRIS  Bulimia Nervosa        Intervention/Counseling/Treatment Plan   · Medication Management: -        Continue Abilify 5 mg by mouth once daily  · Labs: reviewed most recent  · The treatment plan and follow up plan were reviewed with the patient.  · Discussed with patient informed consent, risks vs. benefits, alternative treatments, side effect profile and the inherent unpredictability of individual responses to these treatments. The patient expresses understanding of the above and displays the capacity to agree with this current plan and had no other questions.  · Encouraged Patient to keep future appointments.   · Take medications as prescribed and abstain from substance abuse.   · In the event of an emergency patient was advised to go to the emergency room.     Return to Clinic: 3 to 6 months     > than 50% of total time spend on coordination of care and counseling   (which included pts differential diagnosis and prognosis for psychiatric conditions, risks, benefits of treatments, instructions and adherence to treatment plan, risk reduction, reviewing current psychiatric medication regimen, medical problems and social stressors. In addtion to possible discussion with  other healthcare provider/s)     Add on Psychotherapy time:0  Total Face time: 15 min        The patient location is: [place], at home  The chief complaint leading to consultation is: med f/u     Visit type: audiovisual     Face to Face time with patient: 15 min  20 minutes of total time spent on the encounter, which includes face to face time and non-face to face time preparing to see the patient (eg, review of tests), Obtaining and/or reviewing separately obtained history, Documenting clinical information in the electronic or other health record, Independently interpreting results (not separately reported) and communicating results to the patient/family/caregiver, or Care coordination (not separately reported).            Each patient to whom he or she provides medical services by telemedicine is:  (1) informed of the relationship between the physician and patient and the respective role of any other health care provider with respect to management of the patient; and (2) notified that he or she may decline to receive medical services by telemedicine and may withdraw from such care at any time.     Notes:      Nate Perdue, MSN, APRN, PMHNP-BC  [facility]   5/16/2022 7:00 AM

## 2022-10-07 ENCOUNTER — OFFICE VISIT (OUTPATIENT)
Dept: PSYCHIATRY | Facility: CLINIC | Age: 22
End: 2022-10-07
Payer: COMMERCIAL

## 2022-10-07 DIAGNOSIS — F50.2 BULIMIA NERVOSA: ICD-10-CM

## 2022-10-07 DIAGNOSIS — F39 MOOD DISORDER: ICD-10-CM

## 2022-10-07 DIAGNOSIS — F41.1 GENERALIZED ANXIETY DISORDER: Primary | ICD-10-CM

## 2022-10-07 PROCEDURE — 99999 PR PBB SHADOW E&M-EST. PATIENT-LVL I: CPT | Mod: PBBFAC,,, | Performed by: NURSE PRACTITIONER

## 2022-10-07 PROCEDURE — 99999 PR PBB SHADOW E&M-EST. PATIENT-LVL I: ICD-10-PCS | Mod: PBBFAC,,, | Performed by: NURSE PRACTITIONER

## 2022-10-07 PROCEDURE — 99214 PR OFFICE/OUTPT VISIT, EST, LEVL IV, 30-39 MIN: ICD-10-PCS | Mod: 95,S$GLB,, | Performed by: NURSE PRACTITIONER

## 2022-10-07 PROCEDURE — 99214 OFFICE O/P EST MOD 30 MIN: CPT | Mod: 95,S$GLB,, | Performed by: NURSE PRACTITIONER

## 2022-10-07 RX ORDER — BUSPIRONE HYDROCHLORIDE 5 MG/1
5 TABLET ORAL 2 TIMES DAILY
Qty: 60 TABLET | Refills: 2 | Status: SHIPPED | OUTPATIENT
Start: 2022-10-07 | End: 2024-03-11

## 2022-10-07 NOTE — PROGRESS NOTES
"10/7/2022 7:30 AM   Blayne Dai   2000   [id number]                                                                   OUTPATIENT PSYCHIATRY FOLLOW- UP VISIT     Reason for Encounter:  Blayne Dai, a 21 y.o. male,who presents today for follow up of anxiety, depression, hypomanic episodes. Met with patient.     Interval History and Content of Current Session:     Today,  Pt reports doing well overall. Reports has been doing ok starting [employer].     Reports anxiety about social settings, getting anxious about academics. Reports "I'm feeling like I'm gonna fail."     Reports "I feel like my muscles are so tight all the time because it hurts." Reports has been sleeping ok, go to bed at 9:30 to 10 pm and wake up around 6:30 am, Reports Monday has a lot of classes.     Thinking of changing majors to PT, considering this strongly.     Recommend getting back into swimming as well.     Denies SI/HI, denies self harm, denies substance use.          Psychosocial stressors: academic, family, social pressures        Review Of Systems:      Medical Review Of Systems:  Pertinent items are noted in HPI.     Psychiatric Review Of Systems:  Sleep: no  appetite changes: no  weight changes: denies  energy/anergy: no  interest/pleasure/anhedonia: no  somatic symptoms: no  libido: no  anxiety/panic: no  guilty/hopeless: no  S.I.B.s/risky behavior: no  any drugs: no  alcohol: no     Sleep: 7 to 8 hours per night, to bed at 9 pm and awake at 4 am to 5 am        Risk Parameters:  Patient reports no suicidal ideation  Patient reports no homicidal ideation  Patient reports no self-injurious behavior  Patient reports no violent behavior        Current meds- Abilify, buspirone     Compliance: yes     Side effects: None        Psychiatric, social, and family history reviewed this visit                 Objective      ALL MEDICATIONS:     Current Outpatient Medications:     ARIPiprazole (ABILIFY) 5 MG Tab, TAKE 1 TABLET(2 MG) BY MOUTH EVERY " DAY, Disp: 30 tablet, Rfl: 2    FLUARIX QUAD 0480-2463, PF, 60 mcg (15 mcg x 4)/0.5 mL Syrg, ADM 0.5ML IM UTD, Disp: , Rfl: 0    somatropin 12 mg (36 unit) Crtg, Inject as directed., Disp: , Rfl:      ALLERGIES:  Review of patient's allergies indicates:  No Known Allergies     RELEVANT LABS/STUDIES:              Lab Results   Component Value Date     WBC 7.20 01/20/2020     HGB 15.2 01/20/2020     HCT 48.2 (H) 01/20/2020     MCV 88 01/20/2020      01/20/2020      BMP            Lab Results   Component Value Date      01/20/2020     K 4.1 01/20/2020      01/20/2020     CO2 31 (H) 01/20/2020     BUN 13 01/20/2020     CREATININE 0.8 01/20/2020     CALCIUM 10.9 (H) 01/20/2020     ANIONGAP 9 01/20/2020     ESTGFRAFRICA SEE COMMENT 01/20/2020     EGFRNONAA SEE COMMENT 01/20/2020                Lab Results   Component Value Date     ALT 25 01/20/2020     AST 30 01/20/2020     ALKPHOS 116 01/20/2020     BILITOT 0.3 01/20/2020                Lab Results   Component Value Date     TSH 1.594 08/28/2018                Lab Results   Component Value Date     HGBA1C 5.0 01/20/2020         Constitutional  Vitals:  Most recent vital signs, dated less than 90 days prior to this appointment, were reviewed.    115#  123/69  78            PHYSICAL EXAM  General: well developed, well nourished  Neurologic:   Gait: Normal   Psychomotor signs:  No involuntary movements or tremor  AIMS: none     PSYCHIATRIC EXAM:     Mental Status Exam:  Appearance: unremarkable, age appropriate  Behavior/Cooperation: normal, cooperative  Speech: normal tone, normal rate, normal pitch, normal volume  Language: uses words appropriately; NO aphasia or dysarthria  Mood: steady  Affect: full, congruent with mood and appropriate to content/situation  Thought Process: normal and logical  Thought Content: normal, no suicidality, no homicidality, delusions, or paranoia  Level of Consciousness: Alert and Oriented x3  Memory:   Intact  Attention/concentration: appropriate for age/education.   Fund of Knowledge: appears adequate  Insight: Intact  Judgment: Intact      Assessment and Diagnosis   Status/Progress: Based on the examination today, the patient's problem(s) is/are well controlled.  New problems have been presented today.   Co-morbidities and Diagnostic uncertainty are complicating management of the primary condition.  There are no active rule-out diagnoses for this patient at this time.      General Impression:   Mood Disorder NOS versus Bipolar II disorder  CHRIS  Bulimia Nervosa        Intervention/Counseling/Treatment Plan   Medication Management: -        Continue Abilify 5 mg by mouth once daily        Start buspirone 5 mg by mouth once daily   Labs: reviewed most recent  The treatment plan and follow up plan were reviewed with the patient.  Discussed with patient informed consent, risks vs. benefits, alternative treatments, side effect profile and the inherent unpredictability of individual responses to these treatments. The patient expresses understanding of the above and displays the capacity to agree with this current plan and had no other questions.  Encouraged Patient to keep future appointments.   Take medications as prescribed and abstain from substance abuse.   In the event of an emergency patient was advised to go to the emergency room.     Return to Clinic: 1 month     > than 50% of total time spend on coordination of care and counseling   (which included pts differential diagnosis and prognosis for psychiatric conditions, risks, benefits of treatments, instructions and adherence to treatment plan, risk reduction, reviewing current psychiatric medication regimen, medical problems and social stressors. In addtion to possible discussion with other healthcare provider/s)     Add on Psychotherapy time:0  Total Face time: 15 min        The patient location is: [place], at home  The chief complaint leading to  consultation is: med f/u     Visit type: audiovisual     Face to Face time with patient: 15 min  20 minutes of total time spent on the encounter, which includes face to face time and non-face to face time preparing to see the patient (eg, review of tests), Obtaining and/or reviewing separately obtained history, Documenting clinical information in the electronic or other health record, Independently interpreting results (not separately reported) and communicating results to the patient/family/caregiver, or Care coordination (not separately reported).            Each patient to whom he or she provides medical services by telemedicine is:  (1) informed of the relationship between the physician and patient and the respective role of any other health care provider with respect to management of the patient; and (2) notified that he or she may decline to receive medical services by telemedicine and may withdraw from such care at any time.     Notes:      Nate Perdue, MSN, APRN, PMHNP-BC [facility]   10/7/2022 8:00 AM

## 2023-06-19 ENCOUNTER — PATIENT MESSAGE (OUTPATIENT)
Dept: PSYCHIATRY | Facility: CLINIC | Age: 23
End: 2023-06-19
Payer: COMMERCIAL

## 2023-12-27 ENCOUNTER — OFFICE VISIT (OUTPATIENT)
Dept: URGENT CARE | Facility: CLINIC | Age: 23
End: 2023-12-27
Payer: COMMERCIAL

## 2023-12-27 ENCOUNTER — HOSPITAL ENCOUNTER (OUTPATIENT)
Facility: OTHER | Age: 23
Discharge: HOME | End: 2023-12-28
Attending: EMERGENCY MEDICINE | Admitting: EMERGENCY MEDICINE
Payer: COMMERCIAL

## 2023-12-27 VITALS
DIASTOLIC BLOOD PRESSURE: 69 MMHG | RESPIRATION RATE: 18 BRPM | WEIGHT: 130 LBS | TEMPERATURE: 100 F | OXYGEN SATURATION: 96 % | BODY MASS INDEX: 23.04 KG/M2 | HEIGHT: 63 IN | SYSTOLIC BLOOD PRESSURE: 117 MMHG | HEART RATE: 86 BPM

## 2023-12-27 DIAGNOSIS — R10.31 ACUTE RIGHT LOWER QUADRANT PAIN: Primary | ICD-10-CM

## 2023-12-27 DIAGNOSIS — K35.80 ACUTE APPENDICITIS, UNSPECIFIED ACUTE APPENDICITIS TYPE: Primary | ICD-10-CM

## 2023-12-27 LAB
ALBUMIN SERPL BCP-MCNC: 4.1 G/DL (ref 3.5–5.2)
ALP SERPL-CCNC: 41 U/L (ref 55–135)
ALT SERPL W/O P-5'-P-CCNC: 12 U/L (ref 10–44)
ANION GAP SERPL CALC-SCNC: 12 MMOL/L (ref 8–16)
AST SERPL-CCNC: 16 U/L (ref 10–40)
BASOPHILS NFR BLD: 0 % (ref 0–1.9)
BILIRUB SERPL-MCNC: 1.2 MG/DL (ref 0.1–1)
BILIRUB UR QL STRIP: NEGATIVE
BILIRUB UR QL STRIP: NEGATIVE
BUN SERPL-MCNC: 12 MG/DL (ref 6–20)
CALCIUM SERPL-MCNC: 8.5 MG/DL (ref 8.7–10.5)
CHLORIDE SERPL-SCNC: 104 MMOL/L (ref 95–110)
CLARITY UR: CLEAR
CO2 SERPL-SCNC: 20 MMOL/L (ref 23–29)
COLOR UR: COLORLESS
CREAT SERPL-MCNC: 0.8 MG/DL (ref 0.5–1.4)
CREAT SERPL-MCNC: 0.8 MG/DL (ref 0.5–1.4)
DIFFERENTIAL METHOD: ABNORMAL
EOSINOPHIL NFR BLD: 0 % (ref 0–8)
ERYTHROCYTE [DISTWIDTH] IN BLOOD BY AUTOMATED COUNT: 13.7 % (ref 11.5–14.5)
EST. GFR  (NO RACE VARIABLE): >60 ML/MIN/1.73 M^2
GLUCOSE SERPL-MCNC: 109 MG/DL (ref 70–110)
GLUCOSE UR QL STRIP: NEGATIVE
GLUCOSE UR QL STRIP: NEGATIVE
HCT VFR BLD AUTO: 42.9 % (ref 40–54)
HCV AB SERPL QL IA: NEGATIVE
HGB BLD-MCNC: 14.7 G/DL (ref 14–18)
HGB UR QL STRIP: NEGATIVE
HIV 1+2 AB+HIV1 P24 AG SERPL QL IA: NEGATIVE
IMM GRANULOCYTES # BLD AUTO: ABNORMAL K/UL (ref 0–0.04)
IMM GRANULOCYTES NFR BLD AUTO: ABNORMAL % (ref 0–0.5)
KETONES UR QL STRIP: NEGATIVE
KETONES UR QL STRIP: NEGATIVE
LACTATE SERPL-SCNC: 1.3 MMOL/L (ref 0.5–2.2)
LEUKOCYTE ESTERASE UR QL STRIP: NEGATIVE
LEUKOCYTE ESTERASE UR QL STRIP: NEGATIVE
LYMPHOCYTES NFR BLD: 8 % (ref 18–48)
MCH RBC QN AUTO: 28.9 PG (ref 27–31)
MCHC RBC AUTO-ENTMCNC: 34.3 G/DL (ref 32–36)
MCV RBC AUTO: 84 FL (ref 82–98)
MONOCYTES NFR BLD: 9 % (ref 4–15)
NEUTROPHILS NFR BLD: 76 % (ref 38–73)
NEUTS BAND NFR BLD MANUAL: 7 %
NITRITE UR QL STRIP: NEGATIVE
NRBC BLD-RTO: 0 /100 WBC
PH UR STRIP: 6 [PH] (ref 5–8)
PH, POC UA: 5.5 (ref 5–8)
PLATELET # BLD AUTO: 204 K/UL (ref 150–450)
PMV BLD AUTO: 10 FL (ref 9.2–12.9)
POC BLOOD, URINE: NEGATIVE
POC NITRATES, URINE: NEGATIVE
POTASSIUM SERPL-SCNC: 3.7 MMOL/L (ref 3.5–5.1)
PROT SERPL-MCNC: 6.2 G/DL (ref 6–8.4)
PROT UR QL STRIP: NEGATIVE
PROT UR QL STRIP: NEGATIVE
RBC # BLD AUTO: 5.09 M/UL (ref 4.6–6.2)
SAMPLE: NORMAL
SODIUM SERPL-SCNC: 136 MMOL/L (ref 136–145)
SP GR UR STRIP: 1.01 (ref 1–1.03)
SP GR UR STRIP: 1.02 (ref 1–1.03)
TOXIC GRANULES BLD QL SMEAR: PRESENT
URN SPEC COLLECT METH UR: ABNORMAL
UROBILINOGEN UR STRIP-ACNC: NEGATIVE EU/DL
UROBILINOGEN UR STRIP-ACNC: NORMAL (ref 0.3–2.2)
WBC # BLD AUTO: 29.05 K/UL (ref 3.9–12.7)

## 2023-12-27 PROCEDURE — G0378 HOSPITAL OBSERVATION PER HR: HCPCS

## 2023-12-27 PROCEDURE — 25500020 PHARM REV CODE 255: Performed by: EMERGENCY MEDICINE

## 2023-12-27 PROCEDURE — 81003 URINALYSIS AUTO W/O SCOPE: CPT | Mod: QW,S$GLB,,

## 2023-12-27 PROCEDURE — 63600175 PHARM REV CODE 636 W HCPCS: Performed by: PHYSICIAN ASSISTANT

## 2023-12-27 PROCEDURE — 86803 HEPATITIS C AB TEST: CPT | Performed by: EMERGENCY MEDICINE

## 2023-12-27 PROCEDURE — 81003 URINALYSIS AUTO W/O SCOPE: CPT | Performed by: EMERGENCY MEDICINE

## 2023-12-27 PROCEDURE — 85007 BL SMEAR W/DIFF WBC COUNT: CPT | Performed by: EMERGENCY MEDICINE

## 2023-12-27 PROCEDURE — 87040 BLOOD CULTURE FOR BACTERIA: CPT | Mod: 59 | Performed by: EMERGENCY MEDICINE

## 2023-12-27 PROCEDURE — 83605 ASSAY OF LACTIC ACID: CPT | Performed by: EMERGENCY MEDICINE

## 2023-12-27 PROCEDURE — A4216 STERILE WATER/SALINE, 10 ML: HCPCS | Performed by: PHYSICIAN ASSISTANT

## 2023-12-27 PROCEDURE — 96361 HYDRATE IV INFUSION ADD-ON: CPT

## 2023-12-27 PROCEDURE — 96365 THER/PROPH/DIAG IV INF INIT: CPT

## 2023-12-27 PROCEDURE — 25000003 PHARM REV CODE 250: Performed by: PHYSICIAN ASSISTANT

## 2023-12-27 PROCEDURE — 81003 POCT URINALYSIS, DIPSTICK, AUTOMATED, W/O SCOPE: ICD-10-PCS | Mod: QW,S$GLB,,

## 2023-12-27 PROCEDURE — 99214 OFFICE O/P EST MOD 30 MIN: CPT | Mod: S$GLB,,,

## 2023-12-27 PROCEDURE — 99285 EMERGENCY DEPT VISIT HI MDM: CPT | Mod: 25

## 2023-12-27 PROCEDURE — 63600175 PHARM REV CODE 636 W HCPCS: Performed by: EMERGENCY MEDICINE

## 2023-12-27 PROCEDURE — 99214 PR OFFICE/OUTPT VISIT, EST, LEVL IV, 30-39 MIN: ICD-10-PCS | Mod: S$GLB,,,

## 2023-12-27 PROCEDURE — 96375 TX/PRO/DX INJ NEW DRUG ADDON: CPT

## 2023-12-27 PROCEDURE — 85027 COMPLETE CBC AUTOMATED: CPT | Performed by: EMERGENCY MEDICINE

## 2023-12-27 PROCEDURE — 25000003 PHARM REV CODE 250: Performed by: EMERGENCY MEDICINE

## 2023-12-27 PROCEDURE — 80053 COMPREHEN METABOLIC PANEL: CPT | Performed by: EMERGENCY MEDICINE

## 2023-12-27 PROCEDURE — 96376 TX/PRO/DX INJ SAME DRUG ADON: CPT

## 2023-12-27 PROCEDURE — 87389 HIV-1 AG W/HIV-1&-2 AB AG IA: CPT | Performed by: EMERGENCY MEDICINE

## 2023-12-27 RX ORDER — SODIUM CHLORIDE 0.9 % (FLUSH) 0.9 %
10 SYRINGE (ML) INJECTION EVERY 8 HOURS
Status: DISCONTINUED | OUTPATIENT
Start: 2023-12-27 | End: 2023-12-28 | Stop reason: HOSPADM

## 2023-12-27 RX ORDER — ARIPIPRAZOLE 5 MG/1
5 TABLET ORAL DAILY
Status: DISCONTINUED | OUTPATIENT
Start: 2023-12-28 | End: 2023-12-28 | Stop reason: HOSPADM

## 2023-12-27 RX ORDER — ACETAMINOPHEN 325 MG/1
650 TABLET ORAL EVERY 6 HOURS PRN
Status: DISCONTINUED | OUTPATIENT
Start: 2023-12-27 | End: 2023-12-28 | Stop reason: HOSPADM

## 2023-12-27 RX ORDER — MORPHINE SULFATE 2 MG/ML
2 INJECTION, SOLUTION INTRAMUSCULAR; INTRAVENOUS
Status: COMPLETED | OUTPATIENT
Start: 2023-12-27 | End: 2023-12-27

## 2023-12-27 RX ORDER — HYDROCODONE BITARTRATE AND ACETAMINOPHEN 5; 325 MG/1; MG/1
1 TABLET ORAL EVERY 6 HOURS PRN
Status: DISCONTINUED | OUTPATIENT
Start: 2023-12-27 | End: 2023-12-28 | Stop reason: HOSPADM

## 2023-12-27 RX ORDER — NALOXONE HCL 0.4 MG/ML
0.02 VIAL (ML) INJECTION
Status: DISCONTINUED | OUTPATIENT
Start: 2023-12-27 | End: 2023-12-28 | Stop reason: HOSPADM

## 2023-12-27 RX ORDER — MORPHINE SULFATE 2 MG/ML
2 INJECTION, SOLUTION INTRAMUSCULAR; INTRAVENOUS EVERY 4 HOURS PRN
Status: DISCONTINUED | OUTPATIENT
Start: 2023-12-27 | End: 2023-12-28 | Stop reason: HOSPADM

## 2023-12-27 RX ORDER — SODIUM CHLORIDE 9 MG/ML
1000 INJECTION, SOLUTION INTRAVENOUS
Status: COMPLETED | OUTPATIENT
Start: 2023-12-27 | End: 2023-12-27

## 2023-12-27 RX ORDER — TALC
6 POWDER (GRAM) TOPICAL NIGHTLY PRN
Status: DISCONTINUED | OUTPATIENT
Start: 2023-12-27 | End: 2023-12-28 | Stop reason: HOSPADM

## 2023-12-27 RX ORDER — BUSPIRONE HYDROCHLORIDE 5 MG/1
5 TABLET ORAL 2 TIMES DAILY
Status: DISCONTINUED | OUTPATIENT
Start: 2023-12-27 | End: 2023-12-28 | Stop reason: HOSPADM

## 2023-12-27 RX ORDER — ONDANSETRON 2 MG/ML
4 INJECTION INTRAMUSCULAR; INTRAVENOUS
Status: COMPLETED | OUTPATIENT
Start: 2023-12-27 | End: 2023-12-27

## 2023-12-27 RX ADMIN — MORPHINE SULFATE 2 MG: 2 INJECTION, SOLUTION INTRAMUSCULAR; INTRAVENOUS at 07:12

## 2023-12-27 RX ADMIN — BUSPIRONE HYDROCHLORIDE 5 MG: 5 TABLET ORAL at 10:12

## 2023-12-27 RX ADMIN — MORPHINE SULFATE 2 MG: 2 INJECTION, SOLUTION INTRAMUSCULAR; INTRAVENOUS at 09:12

## 2023-12-27 RX ADMIN — SODIUM CHLORIDE 10 ML: 9 INJECTION, SOLUTION INTRAMUSCULAR; INTRAVENOUS; SUBCUTANEOUS at 10:12

## 2023-12-27 RX ADMIN — PIPERACILLIN AND TAZOBACTAM 4.5 G: 4; .5 INJECTION, POWDER, LYOPHILIZED, FOR SOLUTION INTRAVENOUS; PARENTERAL at 08:12

## 2023-12-27 RX ADMIN — IOHEXOL 75 ML: 350 INJECTION, SOLUTION INTRAVENOUS at 07:12

## 2023-12-27 RX ADMIN — SODIUM CHLORIDE 1000 ML: 9 INJECTION, SOLUTION INTRAVENOUS at 07:12

## 2023-12-27 RX ADMIN — ONDANSETRON 4 MG: 2 INJECTION INTRAMUSCULAR; INTRAVENOUS at 07:12

## 2023-12-27 NOTE — PATIENT INSTRUCTIONS
Go to [facility] for further workup.  I believe you may be experiencing acute appendicitis or other acute abdominal issue. You will need a CT scan for further investigation.  The emergency room has been notified that you will be arriving shortly.  Due to stable vitals, it is okay for you to travel via personal vehicle.

## 2023-12-27 NOTE — PROGRESS NOTES
"Subjective:      Patient ID: Blayne Dai is a 23 y.o. male.    Vitals:  height is 5' 3" (1.6 m) and weight is 59 kg (130 lb). His oral temperature is 99.6 °F (37.6 °C). His blood pressure is 117/69 and his pulse is 86. His respiration is 18 and oxygen saturation is 96%.     Chief Complaint: Abdominal Pain    Patient presents with lower right abdominal pain,back pain, nausea and vomiting. Onset 1 day.      Provider note starts below:  Patient presents to clinic with his mother. Patient states he began having periumbilical pain around 7pm last night. Pain gradually worsened throughout the night. Around 2am he woke up and pain was localized to right lower quadrant. He had multiple episodes of vomiting and nausea since then. Temperature at home today was 101.3F. He has not been able to eat anything today due to poor appetite. States his pain is sharp and stabbing. Pain is worse with any type of movement. States pain worsened "during the car ride over here". No previous abdominal surgeries. No urinary complaints.    Abdominal Pain  This is a new problem. The current episode started yesterday. The onset quality is sudden. The problem occurs constantly. The problem has been gradually worsening. The pain is located in the RLQ. The pain is at a severity of 8/10. The pain is moderate. The quality of the pain is sharp. The abdominal pain does not radiate. Associated symptoms include anorexia, a fever, nausea and vomiting. Pertinent negatives include no constipation, diarrhea, dysuria, frequency, headaches, hematochezia or hematuria. The pain is aggravated by movement. The pain is relieved by Nothing. He has tried nothing for the symptoms. The treatment provided no relief. There is no history of abdominal surgery.       Constitution: Positive for appetite change (decreased) and fever. Negative for chills and fatigue.   Cardiovascular:  Negative for chest pain and palpitations.   Respiratory:  Negative for cough and shortness " of breath.    Gastrointestinal:  Positive for abdominal pain, nausea and vomiting. Negative for abdominal trauma, history of abdominal surgery, constipation, diarrhea, bright red blood in stool and heartburn.   Genitourinary:  Negative for dysuria, frequency, urgency, flank pain and hematuria.   Musculoskeletal:  Negative for abnormal ROM of joint and back pain.   Skin:  Negative for pale and rash.   Neurological:  Negative for dizziness and headaches.      Objective:     Physical Exam   Constitutional: He is oriented to person, place, and time.  Non-toxic appearance. No distress.      Comments:appears uncomfortable     HENT:   Head: Normocephalic and atraumatic.   Eyes: Conjunctivae are normal. Extraocular movement intact   Neck: Neck supple.   Cardiovascular: Normal rate, regular rhythm, normal heart sounds and normal pulses.   Pulmonary/Chest: Effort normal and breath sounds normal.   Abdominal: Normal appearance. He exhibits no distension and no mass. Soft. There is abdominal tenderness in the right lower quadrant. There is guarding and tenderness at McBurney's point. No hernia.   Musculoskeletal: Normal range of motion.         General: Normal range of motion.   Neurological: He is alert, oriented to person, place, and time and at baseline.   Skin: Skin is warm and dry.   Psychiatric: His behavior is normal. Mood normal.   Nursing note and vitals reviewed.      Assessment:     Results for orders placed or performed in visit on 12/27/23   POCT Urinalysis, Dipstick, Automated, W/O Scope   Result Value Ref Range    POC Blood, Urine Negative Negative, Positive Slide, Positive Tube    POC Bilirubin, Urine Negative Negative, Positive Slide, Positive Tube    POC Urobilinogen, Urine normal 0.3 - 2.2    POC Ketones, Urine Negative Negative, Positive Slide, Positive Tube    POC Protein, Urine Negative Negative, Positive Slide, Positive Tube    POC Nitrates, Urine Negative Negative, Positive Slide, Positive Tube    POC  Glucose, Urine Negative Negative, Positive Slide, Positive Tube    pH, UA 5.5 5 - 8    POC Specific Gravity, Urine 1.010 1.003 - 1.029    POC Leukocytes, Urine Negative Negative, Positive Slide, Positive Tube       1. Acute right lower quadrant pain      Plan:       Acute right lower quadrant pain  -     POCT Urinalysis, Dipstick, Automated, W/O Scope       - urinalysis within normal limits  - discussed pt's case w/ Dr. Corbett in clinic and Anand charge nurse at [facility] ED.  - sending pt to ED for appendicitis r/o      Medical Decision Making:   Urgent Care Management:  Discussed patient's case with Dr. Corbett in clinic who agrees with plan to send to ED for appendicitis work up.    Spoke with Anand charge nurse at [facility] emergency room. Discussed patient's case and ER is aware patient will be heading there via private vehicle.    Patient and mother agreeable to plan of care and agree to go to [facility].

## 2023-12-28 ENCOUNTER — ANESTHESIA (OUTPATIENT)
Dept: SURGERY | Facility: OTHER | Age: 23
End: 2023-12-28
Payer: COMMERCIAL

## 2023-12-28 ENCOUNTER — ANESTHESIA EVENT (OUTPATIENT)
Dept: SURGERY | Facility: OTHER | Age: 23
End: 2023-12-28
Payer: COMMERCIAL

## 2023-12-28 VITALS
RESPIRATION RATE: 16 BRPM | TEMPERATURE: 98 F | HEART RATE: 85 BPM | SYSTOLIC BLOOD PRESSURE: 112 MMHG | BODY MASS INDEX: 20.71 KG/M2 | OXYGEN SATURATION: 100 % | DIASTOLIC BLOOD PRESSURE: 59 MMHG | WEIGHT: 116.88 LBS | HEIGHT: 63 IN

## 2023-12-28 LAB
ANION GAP SERPL CALC-SCNC: 11 MMOL/L (ref 8–16)
BASOPHILS # BLD AUTO: 0.07 K/UL (ref 0–0.2)
BASOPHILS NFR BLD: 0.3 % (ref 0–1.9)
BUN SERPL-MCNC: 11 MG/DL (ref 6–20)
CALCIUM SERPL-MCNC: 9 MG/DL (ref 8.7–10.5)
CHLORIDE SERPL-SCNC: 103 MMOL/L (ref 95–110)
CO2 SERPL-SCNC: 24 MMOL/L (ref 23–29)
CREAT SERPL-MCNC: 0.8 MG/DL (ref 0.5–1.4)
DIFFERENTIAL METHOD: ABNORMAL
EOSINOPHIL # BLD AUTO: 0 K/UL (ref 0–0.5)
EOSINOPHIL NFR BLD: 0.1 % (ref 0–8)
ERYTHROCYTE [DISTWIDTH] IN BLOOD BY AUTOMATED COUNT: 12.6 % (ref 11.5–14.5)
EST. GFR  (NO RACE VARIABLE): >60 ML/MIN/1.73 M^2
GLUCOSE SERPL-MCNC: 102 MG/DL (ref 70–110)
HCT VFR BLD AUTO: 39.8 % (ref 40–54)
HGB BLD-MCNC: 14 G/DL (ref 14–18)
IMM GRANULOCYTES # BLD AUTO: 0.09 K/UL (ref 0–0.04)
IMM GRANULOCYTES NFR BLD AUTO: 0.4 % (ref 0–0.5)
LYMPHOCYTES # BLD AUTO: 2.6 K/UL (ref 1–4.8)
LYMPHOCYTES NFR BLD: 11.4 % (ref 18–48)
MAGNESIUM SERPL-MCNC: 1.6 MG/DL (ref 1.6–2.6)
MCH RBC QN AUTO: 29.4 PG (ref 27–31)
MCHC RBC AUTO-ENTMCNC: 35.2 G/DL (ref 32–36)
MCV RBC AUTO: 84 FL (ref 82–98)
MONOCYTES # BLD AUTO: 1.4 K/UL (ref 0.3–1)
MONOCYTES NFR BLD: 6.2 % (ref 4–15)
NEUTROPHILS # BLD AUTO: 18.6 K/UL (ref 1.8–7.7)
NEUTROPHILS NFR BLD: 81.6 % (ref 38–73)
NRBC BLD-RTO: 0 /100 WBC
PLATELET # BLD AUTO: 181 K/UL (ref 150–450)
PMV BLD AUTO: 9.7 FL (ref 9.2–12.9)
POTASSIUM SERPL-SCNC: 3.6 MMOL/L (ref 3.5–5.1)
RBC # BLD AUTO: 4.76 M/UL (ref 4.6–6.2)
SODIUM SERPL-SCNC: 138 MMOL/L (ref 136–145)
WBC # BLD AUTO: 22.84 K/UL (ref 3.9–12.7)

## 2023-12-28 PROCEDURE — 25000003 PHARM REV CODE 250: Performed by: NURSE ANESTHETIST, CERTIFIED REGISTERED

## 2023-12-28 PROCEDURE — 36000709 HC OR TIME LEV III EA ADD 15 MIN: Performed by: SURGERY

## 2023-12-28 PROCEDURE — 88304 TISSUE EXAM BY PATHOLOGIST: CPT | Mod: 26,,, | Performed by: STUDENT IN AN ORGANIZED HEALTH CARE EDUCATION/TRAINING PROGRAM

## 2023-12-28 PROCEDURE — D9220A PRA ANESTHESIA: ICD-10-PCS | Mod: CRNA,,, | Performed by: NURSE ANESTHETIST, CERTIFIED REGISTERED

## 2023-12-28 PROCEDURE — 44970 PR LAP,APPENDECTOMY: ICD-10-PCS | Mod: ,,, | Performed by: SURGERY

## 2023-12-28 PROCEDURE — 37000008 HC ANESTHESIA 1ST 15 MINUTES: Performed by: SURGERY

## 2023-12-28 PROCEDURE — 36000708 HC OR TIME LEV III 1ST 15 MIN: Performed by: SURGERY

## 2023-12-28 PROCEDURE — 36415 COLL VENOUS BLD VENIPUNCTURE: CPT | Performed by: PHYSICIAN ASSISTANT

## 2023-12-28 PROCEDURE — 37000009 HC ANESTHESIA EA ADD 15 MINS: Performed by: SURGERY

## 2023-12-28 PROCEDURE — 85025 COMPLETE CBC W/AUTO DIFF WBC: CPT | Performed by: PHYSICIAN ASSISTANT

## 2023-12-28 PROCEDURE — 64488 TAP BLOCK BI INJECTION: CPT | Mod: 59 | Performed by: ANESTHESIOLOGY

## 2023-12-28 PROCEDURE — D9220A PRA ANESTHESIA: Mod: CRNA,,, | Performed by: NURSE ANESTHETIST, CERTIFIED REGISTERED

## 2023-12-28 PROCEDURE — 27201423 OPTIME MED/SURG SUP & DEVICES STERILE SUPPLY: Performed by: SURGERY

## 2023-12-28 PROCEDURE — G0378 HOSPITAL OBSERVATION PER HR: HCPCS

## 2023-12-28 PROCEDURE — D9220A PRA ANESTHESIA: Mod: ANES,,, | Performed by: ANESTHESIOLOGY

## 2023-12-28 PROCEDURE — 99214 PR OFFICE/OUTPT VISIT, EST, LEVL IV, 30-39 MIN: ICD-10-PCS | Mod: 57,,, | Performed by: SURGERY

## 2023-12-28 PROCEDURE — 44970 LAPAROSCOPY APPENDECTOMY: CPT | Mod: ,,, | Performed by: SURGERY

## 2023-12-28 PROCEDURE — 71000033 HC RECOVERY, INTIAL HOUR: Performed by: SURGERY

## 2023-12-28 PROCEDURE — 71000039 HC RECOVERY, EACH ADD'L HOUR: Performed by: SURGERY

## 2023-12-28 PROCEDURE — 96366 THER/PROPH/DIAG IV INF ADDON: CPT | Mod: 59

## 2023-12-28 PROCEDURE — 64488 PERIPHERAL BLOCK: ICD-10-PCS | Mod: 59,,, | Performed by: ANESTHESIOLOGY

## 2023-12-28 PROCEDURE — 96365 THER/PROPH/DIAG IV INF INIT: CPT | Mod: 59

## 2023-12-28 PROCEDURE — A4216 STERILE WATER/SALINE, 10 ML: HCPCS | Performed by: PHYSICIAN ASSISTANT

## 2023-12-28 PROCEDURE — 63600175 PHARM REV CODE 636 W HCPCS: Performed by: NURSE ANESTHETIST, CERTIFIED REGISTERED

## 2023-12-28 PROCEDURE — D9220A PRA ANESTHESIA: ICD-10-PCS | Mod: ANES,,, | Performed by: ANESTHESIOLOGY

## 2023-12-28 PROCEDURE — 63600175 PHARM REV CODE 636 W HCPCS: Performed by: STUDENT IN AN ORGANIZED HEALTH CARE EDUCATION/TRAINING PROGRAM

## 2023-12-28 PROCEDURE — 25000003 PHARM REV CODE 250: Performed by: PHYSICIAN ASSISTANT

## 2023-12-28 PROCEDURE — 63600175 PHARM REV CODE 636 W HCPCS: Performed by: ANESTHESIOLOGY

## 2023-12-28 PROCEDURE — 64488 TAP BLOCK BI INJECTION: CPT | Mod: 59,,, | Performed by: ANESTHESIOLOGY

## 2023-12-28 PROCEDURE — 99214 OFFICE O/P EST MOD 30 MIN: CPT | Mod: 57,,, | Performed by: SURGERY

## 2023-12-28 PROCEDURE — 88304 TISSUE EXAM BY PATHOLOGIST: CPT | Performed by: STUDENT IN AN ORGANIZED HEALTH CARE EDUCATION/TRAINING PROGRAM

## 2023-12-28 PROCEDURE — 88304 PR  SURG PATH,LEVEL III: ICD-10-PCS | Mod: 26,,, | Performed by: STUDENT IN AN ORGANIZED HEALTH CARE EDUCATION/TRAINING PROGRAM

## 2023-12-28 PROCEDURE — 63600175 PHARM REV CODE 636 W HCPCS: Performed by: PHYSICIAN ASSISTANT

## 2023-12-28 PROCEDURE — 83735 ASSAY OF MAGNESIUM: CPT | Performed by: PHYSICIAN ASSISTANT

## 2023-12-28 PROCEDURE — 80048 BASIC METABOLIC PNL TOTAL CA: CPT | Performed by: PHYSICIAN ASSISTANT

## 2023-12-28 RX ORDER — DEXAMETHASONE SODIUM PHOSPHATE 4 MG/ML
INJECTION, SOLUTION INTRA-ARTICULAR; INTRALESIONAL; INTRAMUSCULAR; INTRAVENOUS; SOFT TISSUE
Status: DISCONTINUED | OUTPATIENT
Start: 2023-12-28 | End: 2023-12-28

## 2023-12-28 RX ORDER — ROPIVACAINE HYDROCHLORIDE 5 MG/ML
INJECTION, SOLUTION EPIDURAL; INFILTRATION; PERINEURAL
Status: COMPLETED | OUTPATIENT
Start: 2023-12-28 | End: 2023-12-28

## 2023-12-28 RX ORDER — SODIUM CHLORIDE 9 MG/ML
INJECTION, SOLUTION INTRAVENOUS CONTINUOUS
Status: DISCONTINUED | OUTPATIENT
Start: 2023-12-28 | End: 2023-12-28 | Stop reason: HOSPADM

## 2023-12-28 RX ORDER — OXYCODONE HYDROCHLORIDE 5 MG/1
5 TABLET ORAL EVERY 4 HOURS PRN
Status: DISCONTINUED | OUTPATIENT
Start: 2023-12-28 | End: 2023-12-28 | Stop reason: HOSPADM

## 2023-12-28 RX ORDER — SODIUM CHLORIDE 9 MG/ML
INJECTION, SOLUTION INTRAVENOUS CONTINUOUS
Status: CANCELLED | OUTPATIENT
Start: 2023-12-28

## 2023-12-28 RX ORDER — OXYCODONE HYDROCHLORIDE 5 MG/1
5 CAPSULE ORAL EVERY 6 HOURS PRN
Qty: 20 CAPSULE | Refills: 0 | Status: SHIPPED | OUTPATIENT
Start: 2023-12-28 | End: 2024-03-11

## 2023-12-28 RX ORDER — METRONIDAZOLE 500 MG/1
500 TABLET ORAL EVERY 8 HOURS
Qty: 15 TABLET | Refills: 0 | Status: SHIPPED | OUTPATIENT
Start: 2023-12-28 | End: 2024-01-02

## 2023-12-28 RX ORDER — DIPHENHYDRAMINE HYDROCHLORIDE 50 MG/ML
12.5 INJECTION INTRAMUSCULAR; INTRAVENOUS EVERY 30 MIN PRN
Status: DISCONTINUED | OUTPATIENT
Start: 2023-12-28 | End: 2023-12-28 | Stop reason: HOSPADM

## 2023-12-28 RX ORDER — MEPERIDINE HYDROCHLORIDE 25 MG/ML
12.5 INJECTION INTRAMUSCULAR; INTRAVENOUS; SUBCUTANEOUS ONCE AS NEEDED
Status: DISCONTINUED | OUTPATIENT
Start: 2023-12-28 | End: 2023-12-28 | Stop reason: HOSPADM

## 2023-12-28 RX ORDER — ACETAMINOPHEN 10 MG/ML
INJECTION, SOLUTION INTRAVENOUS
Status: DISCONTINUED | OUTPATIENT
Start: 2023-12-28 | End: 2023-12-28

## 2023-12-28 RX ORDER — PROPOFOL 10 MG/ML
VIAL (ML) INTRAVENOUS
Status: DISCONTINUED | OUTPATIENT
Start: 2023-12-28 | End: 2023-12-28

## 2023-12-28 RX ORDER — PHENYLEPHRINE HYDROCHLORIDE 10 MG/ML
INJECTION INTRAVENOUS
Status: DISCONTINUED | OUTPATIENT
Start: 2023-12-28 | End: 2023-12-28

## 2023-12-28 RX ORDER — ONDANSETRON 2 MG/ML
INJECTION INTRAMUSCULAR; INTRAVENOUS
Status: DISCONTINUED | OUTPATIENT
Start: 2023-12-28 | End: 2023-12-28

## 2023-12-28 RX ORDER — FENTANYL CITRATE 50 UG/ML
INJECTION, SOLUTION INTRAMUSCULAR; INTRAVENOUS
Status: DISCONTINUED | OUTPATIENT
Start: 2023-12-28 | End: 2023-12-28

## 2023-12-28 RX ORDER — SODIUM CHLORIDE 0.9 % (FLUSH) 0.9 %
3 SYRINGE (ML) INJECTION
Status: DISCONTINUED | OUTPATIENT
Start: 2023-12-28 | End: 2023-12-28 | Stop reason: HOSPADM

## 2023-12-28 RX ORDER — ONDANSETRON 4 MG/1
4 TABLET, ORALLY DISINTEGRATING ORAL EVERY 8 HOURS PRN
Qty: 20 TABLET | Refills: 0 | Status: SHIPPED | OUTPATIENT
Start: 2023-12-28 | End: 2024-03-11

## 2023-12-28 RX ORDER — CIPROFLOXACIN 500 MG/1
500 TABLET ORAL EVERY 12 HOURS
Qty: 10 TABLET | Refills: 0 | Status: SHIPPED | OUTPATIENT
Start: 2023-12-28 | End: 2024-01-02

## 2023-12-28 RX ORDER — LIDOCAINE HYDROCHLORIDE 20 MG/ML
INJECTION INTRAVENOUS
Status: DISCONTINUED | OUTPATIENT
Start: 2023-12-28 | End: 2023-12-28

## 2023-12-28 RX ORDER — PROCHLORPERAZINE EDISYLATE 5 MG/ML
5 INJECTION INTRAMUSCULAR; INTRAVENOUS EVERY 30 MIN PRN
Status: DISCONTINUED | OUTPATIENT
Start: 2023-12-28 | End: 2023-12-28 | Stop reason: HOSPADM

## 2023-12-28 RX ORDER — HYDROMORPHONE HYDROCHLORIDE 2 MG/ML
0.4 INJECTION, SOLUTION INTRAMUSCULAR; INTRAVENOUS; SUBCUTANEOUS EVERY 5 MIN PRN
Status: DISCONTINUED | OUTPATIENT
Start: 2023-12-28 | End: 2023-12-28 | Stop reason: HOSPADM

## 2023-12-28 RX ORDER — ROCURONIUM BROMIDE 10 MG/ML
INJECTION, SOLUTION INTRAVENOUS
Status: DISCONTINUED | OUTPATIENT
Start: 2023-12-28 | End: 2023-12-28

## 2023-12-28 RX ORDER — MIDAZOLAM HYDROCHLORIDE 1 MG/ML
INJECTION INTRAMUSCULAR; INTRAVENOUS
Status: DISCONTINUED | OUTPATIENT
Start: 2023-12-28 | End: 2023-12-28

## 2023-12-28 RX ORDER — HYDROCODONE BITARTRATE AND ACETAMINOPHEN 5; 325 MG/1; MG/1
1 TABLET ORAL EVERY 4 HOURS PRN
Status: CANCELLED | OUTPATIENT
Start: 2023-12-28

## 2023-12-28 RX ORDER — DOCUSATE SODIUM 100 MG/1
100 CAPSULE, LIQUID FILLED ORAL 2 TIMES DAILY
Status: CANCELLED | OUTPATIENT
Start: 2023-12-28

## 2023-12-28 RX ADMIN — PHENYLEPHRINE HYDROCHLORIDE 100 MCG: 10 INJECTION INTRAVENOUS at 10:12

## 2023-12-28 RX ADMIN — SUGAMMADEX 200 MG: 100 INJECTION, SOLUTION INTRAVENOUS at 11:12

## 2023-12-28 RX ADMIN — SODIUM CHLORIDE 10 ML: 9 INJECTION, SOLUTION INTRAMUSCULAR; INTRAVENOUS; SUBCUTANEOUS at 06:12

## 2023-12-28 RX ADMIN — PROPOFOL 100 MG: 10 INJECTION, EMULSION INTRAVENOUS at 10:12

## 2023-12-28 RX ADMIN — MIDAZOLAM HYDROCHLORIDE 2 MG: 1 INJECTION, SOLUTION INTRAMUSCULAR; INTRAVENOUS at 10:12

## 2023-12-28 RX ADMIN — SODIUM CHLORIDE, SODIUM LACTATE, POTASSIUM CHLORIDE, AND CALCIUM CHLORIDE: .6; .31; .03; .02 INJECTION, SOLUTION INTRAVENOUS at 10:12

## 2023-12-28 RX ADMIN — ACETAMINOPHEN 1000 MG: 10 INJECTION, SOLUTION INTRAVENOUS at 10:12

## 2023-12-28 RX ADMIN — ROCURONIUM BROMIDE 40 MG: 10 SOLUTION INTRAVENOUS at 10:12

## 2023-12-28 RX ADMIN — FENTANYL CITRATE 100 MCG: 50 INJECTION, SOLUTION INTRAMUSCULAR; INTRAVENOUS at 10:12

## 2023-12-28 RX ADMIN — SODIUM CHLORIDE 10 ML: 9 INJECTION, SOLUTION INTRAMUSCULAR; INTRAVENOUS; SUBCUTANEOUS at 02:12

## 2023-12-28 RX ADMIN — PROPOFOL 30 MG: 10 INJECTION, EMULSION INTRAVENOUS at 11:12

## 2023-12-28 RX ADMIN — PIPERACILLIN AND TAZOBACTAM 4.5 G: 4; .5 INJECTION, POWDER, LYOPHILIZED, FOR SOLUTION INTRAVENOUS; PARENTERAL at 04:12

## 2023-12-28 RX ADMIN — ARIPIPRAZOLE 5 MG: 5 TABLET ORAL at 08:12

## 2023-12-28 RX ADMIN — CARBOXYMETHYLCELLULOSE SODIUM 2 DROP: 2.5 SOLUTION/ DROPS OPHTHALMIC at 10:12

## 2023-12-28 RX ADMIN — DEXAMETHASONE SODIUM PHOSPHATE 8 MG: 4 INJECTION, SOLUTION INTRAMUSCULAR; INTRAVENOUS at 10:12

## 2023-12-28 RX ADMIN — SODIUM CHLORIDE: 9 INJECTION, SOLUTION INTRAVENOUS at 04:12

## 2023-12-28 RX ADMIN — LIDOCAINE HYDROCHLORIDE 40 MG: 20 INJECTION, SOLUTION INTRAVENOUS at 10:12

## 2023-12-28 RX ADMIN — PIPERACILLIN AND TAZOBACTAM 4.5 G: 4; .5 INJECTION, POWDER, LYOPHILIZED, FOR SOLUTION INTRAVENOUS; PARENTERAL at 12:12

## 2023-12-28 RX ADMIN — ROPIVACAINE HYDROCHLORIDE 30 ML: 5 INJECTION, SOLUTION EPIDURAL; INFILTRATION; PERINEURAL at 10:12

## 2023-12-28 RX ADMIN — BUSPIRONE HYDROCHLORIDE 5 MG: 5 TABLET ORAL at 08:12

## 2023-12-28 RX ADMIN — ONDANSETRON HYDROCHLORIDE 4 MG: 2 INJECTION INTRAMUSCULAR; INTRAVENOUS at 11:12

## 2023-12-28 NOTE — PLAN OF CARE
LMSW met with the patient and family at the bedside. Patient is alert and oriented with no communication barriers. Prior to admission, the patient is independent. Patient denies the use of HH or DME. Patients PCP is correct on the face sheet. Patient choice pharmacy is bedside. Patient's family will transport the patient home at discharge.     [facility] ([address])  Initial Discharge Assessment       Primary Care Provider: Pietro Stinson MD    Admission Diagnosis: Acute appendicitis, unspecified acute appendicitis type [K35.80]    Admission Date: 12/27/2023  Expected Discharge Date:     Transition of Care Barriers: (P) None    Payor: [employer] / Plan: [employer] ([facility]) / Product Type: Commercial /     Extended Emergency Contact Information  Primary Emergency Contact: Raquel Cool   [place]  Mobile Phone: [phone]  Relation: Mother  Secondary Emergency Contact: [place] [address] [facility]  Home Phone: [phone]  Relation: Grandparent    Discharge Plan A: (P) Home, Home with family         [id number] - [place], [place] - [address] [place] [facility] [place] [address]  [address]  [facility] [address]  Phone: [phone] Fax: [phone]      Initial Assessment (most recent)       Adult Discharge Assessment - 12/28/23 0921          Discharge Assessment    Assessment Type Discharge Planning Assessment (P)      Confirmed/corrected address, phone number and insurance Yes (P)      Confirmed Demographics Correct on Facesheet (P)      Source of Information patient;family (P)      People in Home parent(s);sibling(s) (P)      Do you expect to return to your current living situation? Yes (P)      Do you have help at home or someone to help you manage your care at home? Yes (P)      Who are your caregiver(s) and their phone number(s)? Heather Cooly (Mother)   [phone] (P)      Prior to  hospitilization cognitive status: Alert/Oriented;No Deficits (P)      Current cognitive status: Alert/Oriented;No Deficits (P)      Equipment Currently Used at Home none (P)      Readmission within 30 days? No (P)      Patient currently being followed by outpatient case management? No (P)      Do you currently have service(s) that help you manage your care at home? No (P)      Do you take prescription medications? No (P)      Do you have prescription coverage? Yes (P)      Do you have any problems affording any of your prescribed medications? No (P)      Is the patient taking medications as prescribed? yes (P)      How do you get to doctors appointments? car, drives self;family or friend will provide (P)      Are you on dialysis? No (P)      Do you take coumadin? No (P)      Discharge Plan A Home;Home with family (P)      Discharge Plan discussed with: Patient;Parent(s) (P)      Name(s) and Number(s) Raquel Cool (Mother)   [phone] (P)      Transition of Care Barriers None (P)

## 2023-12-28 NOTE — NURSING
Nurses Note -- 4 Eyes      12/28/2023   5:19 AM      Skin assessed during: Admit      [x] No Altered Skin Integrity Present    []Prevention Measures Documented      [] Yes- Altered Skin Integrity Present or Discovered   [] LDA Added if Not in Epic (Describe Wound)   [] New Altered Skin Integrity was Present on Admit and Documented in LDA   [] Wound Image Taken    Wound Care Consulted? No    Attending Nurse:  DARIO Domingo    Second RN/Staff Member:  DARIO Lewis

## 2023-12-28 NOTE — ANESTHESIA PROCEDURE NOTES
Peripheral Block    Patient location during procedure: pre-op   Block not for primary anesthetic.  Reason for block: at surgeon's request and post-op pain management   Post-op Pain Location: abdominal wall pain    End time: 12/28/2023 10:18 AM    Staffing  Authorizing Provider: Neeraj Powell MD  Performing Provider: Neeraj Powell MD    Staffing  Performed by: Neeraj Powell MD  Authorized by: Neeraj Powell MD    Preanesthetic Checklist  Completed: patient identified, IV checked, site marked, risks and benefits discussed, surgical consent, monitors and equipment checked, pre-op evaluation and timeout performed  Peripheral Block  Patient position: supine  Prep: ChloraPrep  Patient monitoring: cardiac monitor, heart rate, continuous pulse ox, continuous capnometry and frequent blood pressure checks  Block type: TAP  Laterality: bilateral  Injection technique: single shot  Needle  Needle type: Stimuplex   Needle gauge: 21 G  Needle length: 4 in  Needle localization: ultrasound guidance   -ultrasound image captured on disc.  Assessment  Injection assessment: negative aspiration, negative parasthesia and local visualized surrounding nerve  Paresthesia pain: none  Heart rate change: no  Slow fractionated injection: yes  Pain Tolerance: comfortable throughout block and no complaints  Medications:    Medications: ropivacaine (NAROPIN) injection 0.5% - Perineural   30 mL - 12/28/2023 10:18:00 AM    Additional Notes  VSS.  [facility] RN monitoring vitals throughout procedure.  Patient tolerated procedure well. 15mL Ropivacaine each side

## 2023-12-28 NOTE — CONSULTS
[facility] ([address])  General Surgery  Consult Note    Patient Name: Blayne Dai  MRN: [id number]  Code Status: Full Code  Admission Date: 12/27/2023  Hospital Length of Stay: 0 days  Attending Physician: Austin Muniz MD  Primary Care Provider: Pietro Stinson MD    Patient information was obtained from patient and ER records.     Consults  Subjective:     Principal Problem: Acute appendicitis    History of Present Illness: 23 y.o. male, with PMH of Bipolr I, who presented to [facility] ED on 12/27/23 due to RLQ abdominal pain beginning 1 night PTA. The pain began in the center of his abdomen, and then moved to the RLQ. He states the pain is worse with walking, or when going over bumpy roads while driving. He notes associated fever of 100.4F, nausea, and one episode of vomiting. He initially presented to Urgent Care, and was sent to the ED for further evaluation. ED labs revealed leukocytosis of 29K, with left shift. A CT of the abdomen and pelvis showed mild dilation of the appendix at 9 mm with two appendicoliths.     No current facility-administered medications on file prior to encounter.     Current Outpatient Medications on File Prior to Encounter   Medication Sig    ARIPiprazole (ABILIFY) 5 MG Tab Take 1 tablet (5 mg total) by mouth once daily.    busPIRone (BUSPAR) 5 MG Tab Take 1 tablet (5 mg total) by mouth 2 (two) times daily.       Review of patient's allergies indicates:  No Known Allergies    Past Medical History:   Diagnosis Date    Congenital velopharyngeal insufficiency     Growth hormone deficiency     Otitis media     Submucous cleft palate      Past Surgical History:   Procedure Laterality Date    COCCYX FRACTURE SURGERY  2017    PHARYNGEAL FLAP      polyp removal      from both ears at 18 mos    submucous cleft palate      TYMPANOSTOMY TUBE PLACEMENT      3rd set     Family History       Problem Relation (Age of Onset)    Alcohol abuse Mother          Tobacco Use    Smoking  status: Never    Smokeless tobacco: Never   Substance and Sexual Activity    Alcohol use: Yes    Drug use: No    Sexual activity: Never     Review of Systems   Constitutional:  Negative for appetite change, fatigue, fever and unexpected weight change.   HENT:  Negative for sore throat and trouble swallowing.    Eyes: Negative.    Respiratory:  Negative for cough, shortness of breath and wheezing.    Cardiovascular:  Negative for chest pain and leg swelling.   Gastrointestinal:  Positive for abdominal pain. Negative for abdominal distention, blood in stool, constipation, diarrhea, nausea and vomiting.   Endocrine: Negative.    Genitourinary: Negative.    Musculoskeletal:  Negative for back pain.   Skin: Negative.  Negative for rash.   Allergic/Immunologic: Negative.    Neurological: Negative.    Hematological: Negative.    Psychiatric/Behavioral:  Negative for confusion.      Objective:     Vital Signs (Most Recent):  Temp: 98.1 °F (36.7 °C) (12/28/23 0417)  Pulse: 98 (12/28/23 0417)  Resp: 16 (12/28/23 0417)  BP: (!) 119/59 (12/28/23 0417)  SpO2: 98 % (12/28/23 0417) Vital Signs (24h Range):  Temp:  [98.1 °F (36.7 °C)-100.5 °F (38.1 °C)] 98.1 °F (36.7 °C)  Pulse:  [] 98  Resp:  [16-20] 16  SpO2:  [96 %-100 %] 98 %  BP: (117-149)/(58-69) 119/59     Weight: 53 kg (116 lb 13.5 oz)  Body mass index is 20.7 kg/m².     Physical Exam  Vitals and nursing note reviewed.   Constitutional:       Appearance: He is well-developed.   HENT:      Head: Normocephalic and atraumatic.   Cardiovascular:      Rate and Rhythm: Normal rate.      Heart sounds: Normal heart sounds.   Pulmonary:      Effort: Pulmonary effort is normal.   Abdominal:      General: Bowel sounds are normal. There is no distension.      Palpations: Abdomen is soft.      Tenderness: There is abdominal tenderness.   Musculoskeletal:         General: Normal range of motion.      Cervical back: Normal range of motion.   Skin:     General: Skin is warm and dry.       Capillary Refill: Capillary refill takes less than 2 seconds.   Neurological:      Mental Status: He is alert and oriented to person, place, and time.   Psychiatric:         Behavior: Behavior normal.            I have reviewed all pertinent lab results within the past 24 hours.  CBC:   Recent Labs   Lab 12/28/23  0511   WBC 22.84*   RBC 4.76   HGB 14.0   HCT 39.8*      MCV 84   MCH 29.4   MCHC 35.2     CMP:   Recent Labs   Lab 12/27/23 1959 12/28/23  0511    102   CALCIUM 8.5* 9.0   ALBUMIN 4.1  --    PROT 6.2  --     138   K 3.7 3.6   CO2 20* 24    103   BUN 12 11   CREATININE 0.8 0.8   ALKPHOS 41*  --    ALT 12  --    AST 16  --    BILITOT 1.2*  --        Significant Diagnostics:  I have reviewed all pertinent imaging results/findings within the past 24 hours.  CT abdomen pelvis:  Acute appendicitis  Assessment/Plan:     * Acute appendicitis  23-year-old male with acute appendicitis   To OR today for laparoscopic appendectomy   All risks and benefits discussed   Continue IV antibiotics.          VTE Risk Mitigation (From admission, onward)           Ordered     IP VTE LOW RISK PATIENT  Once         12/27/23 2046     Place sequential compression device  Until discontinued         12/27/23 2046                    Thank you for your consult. I will follow-up with patient. Please contact us if you have any additional questions.    Manny Flores MD  General Surgery  [facility] ([address])

## 2023-12-28 NOTE — HPI
23 y.o. male, with PMH of Bipolr I, who presented to [facility] on 12/27/23 due to RLQ abdominal pain beginning 1 night PTA. The pain began in the center of his abdomen, and then moved to the RLQ. He states the pain is worse with walking, or when going over bumpy roads while driving. He notes associated fever of 100.4F, nausea, and one episode of vomiting. He initially presented to Urgent Care, and was sent to the ED for further evaluation. ED labs revealed leukocytosis of 29K, with left shift. A CT of the abdomen and pelvis showed mild dilation of the appendix at 9 mm with two appendicoliths.

## 2023-12-28 NOTE — SUBJECTIVE & OBJECTIVE
Interval History: Seen at bedside with mother present, reports improvement in pain. Bowel sounds positive, plan to go to OR today for appendectomy    Review of Systems   Constitutional:  Negative for diaphoresis and fever.   Respiratory:  Negative for cough and shortness of breath.    Cardiovascular:  Negative for chest pain and palpitations.   Gastrointestinal:  Positive for abdominal pain, nausea and vomiting. Negative for diarrhea.   Genitourinary:  Negative for difficulty urinating and dysuria.   Musculoskeletal:  Negative for back pain.   Neurological:  Negative for dizziness, syncope and weakness.   Psychiatric/Behavioral:  Negative for agitation and confusion.      Objective:     Vital Signs (Most Recent):  Temp: 98.7 °F (37.1 °C) (12/28/23 0813)  Pulse: 88 (12/28/23 0813)  Resp: 18 (12/28/23 0813)  BP: 110/62 (12/28/23 0813)  SpO2: 97 % (12/28/23 0813) Vital Signs (24h Range):  Temp:  [98.1 °F (36.7 °C)-100.5 °F (38.1 °C)] 98.7 °F (37.1 °C)  Pulse:  [] 88  Resp:  [16-20] 18  SpO2:  [96 %-100 %] 97 %  BP: (110-149)/(58-69) 110/62     Weight: 53 kg (116 lb 13.5 oz)  Body mass index is 20.7 kg/m².    Intake/Output Summary (Last 24 hours) at 12/28/2023 1014  Last data filed at 12/27/2023 2120  Gross per 24 hour   Intake 1100 ml   Output --   Net 1100 ml         Physical Exam  Vitals and nursing note reviewed.   Constitutional:       General: He is not in acute distress.     Appearance: Normal appearance. He is well-developed.   HENT:      Head: Normocephalic and atraumatic.   Eyes:      General:         Right eye: No discharge.         Left eye: No discharge.   Neck:      Vascular: No JVD.      Trachea: No tracheal deviation.   Cardiovascular:      Rate and Rhythm: Normal rate.      Heart sounds: No murmur heard.  Pulmonary:      Effort: Pulmonary effort is normal. No respiratory distress.   Abdominal:      General: Abdomen is flat. There is no distension.      Palpations: Abdomen is soft.      Tenderness:  There is abdominal tenderness (diffuse, greatest in RLQ).   Musculoskeletal:         General: Normal range of motion.   Skin:     General: Skin is warm and dry.   Neurological:      General: No focal deficit present.      Mental Status: He is alert.      Motor: No abnormal muscle tone.   Psychiatric:         Mood and Affect: Mood normal.         Behavior: Behavior normal.             Significant Labs: All pertinent labs within the past 24 hours have been reviewed.    Significant Imaging: I have reviewed all pertinent imaging results/findings within the past 24 hours.

## 2023-12-28 NOTE — ASSESSMENT & PLAN NOTE
- Mr. Blayne Dai presents with RLQ abdominal pain   - General Surgery consulted   - continue zosyn   - continue PRN analgesics and antiemetics

## 2023-12-28 NOTE — TRANSFER OF CARE
"Anesthesia Transfer of Care Note    Patient: Blayne Dai    Procedure(s) Performed: Procedure(s) (LRB):  APPENDECTOMY, LAPAROSCOPIC (N/A)    Patient location: PACU    Anesthesia Type: general    Transport from OR: Transported from OR on 6-10 L/min O2 by face mask with adequate spontaneous ventilation    Post pain: adequate analgesia    Post assessment: no apparent anesthetic complications    Post vital signs: stable    Level of consciousness: sedated    Nausea/Vomiting: no nausea/vomiting    Complications: none    Transfer of care protocol was followed      Last vitals: Visit Vitals  /62   Pulse 88   Temp 37.1 °C (98.7 °F) (Oral)   Resp 18   Ht 5' 3" (1.6 m)   Wt 53 kg (116 lb 13.5 oz)   SpO2 97%   BMI 20.70 kg/m²     "

## 2023-12-28 NOTE — H&P
[facility] Medicine  History & Physical    Patient Name: Blayne Dai  MRN: [id number]  Patient Class: OP- Observation  Admission Date: 12/27/2023  Attending Physician: Austin Muniz MD   Primary Care Provider: Pietro Stinson MD         Patient information was obtained from patient, parent, past medical records, and ER records.     Subjective:     Principal Problem:Acute appendicitis    Chief Complaint:   Chief Complaint   Patient presents with    Abdominal Pain     RLQ pain since last night w/ one episode of vomiting  Evaluated at Urgent care today and was sent here to rule out Acute Appendicitis or other Acute Abd Issues.         HPI: Mr. Blayne Dai is a 23 y.o. male, with PMH of Bipolr I, who presented to [facility] ED on 12/27/23 due to RLQ abdominal pain beginning 1 night PTA. The pain began in the center of his abdomen, and then moved to the RLQ. He states the pain is worse with walking, or when going over bumpy roads while driving. He notes associated fever of 100.4F, nausea, and one episode of vomiting. He initially presented to Urgent Care, and was sent to the ED for further evaluation. ED labs revealed leukocytosis of 29K, with left shift. A CT of the abdomen and pelvis showed mild dilation of the appendix at 9 mm with two appendicoliths. He was treated in the ED with zosyn. He was placed on observation pending surgical evaluation in the AM.     Past Medical History:   Diagnosis Date    Congenital velopharyngeal insufficiency     Growth hormone deficiency     Otitis media     Submucous cleft palate        Past Surgical History:   Procedure Laterality Date    COCCYX FRACTURE SURGERY  2017    PHARYNGEAL FLAP      polyp removal      from both ears at 18 mos    submucous cleft palate      TYMPANOSTOMY TUBE PLACEMENT      3rd set       Review of patient's allergies indicates:  No Known Allergies    No current facility-administered medications on file prior to encounter.      Current Outpatient Medications on File Prior to Encounter   Medication Sig    ARIPiprazole (ABILIFY) 5 MG Tab Take 1 tablet (5 mg total) by mouth once daily.    busPIRone (BUSPAR) 5 MG Tab Take 1 tablet (5 mg total) by mouth 2 (two) times daily.     Family History       Problem Relation (Age of Onset)    Alcohol abuse Mother          Tobacco Use    Smoking status: Never    Smokeless tobacco: Never   Substance and Sexual Activity    Alcohol use: Yes    Drug use: No    Sexual activity: Never     Review of Systems   Constitutional:  Negative for chills, diaphoresis and fever.   Respiratory:  Negative for cough, shortness of breath and wheezing.    Cardiovascular:  Negative for chest pain and palpitations.   Gastrointestinal:  Positive for abdominal pain, nausea and vomiting. Negative for constipation and diarrhea.   Genitourinary:  Negative for decreased urine volume, difficulty urinating, dysuria, frequency, hematuria and urgency.   Musculoskeletal:  Negative for back pain, neck pain and neck stiffness.   Skin:  Negative for rash and wound.   Neurological:  Negative for dizziness, syncope, weakness, light-headedness and headaches.   Hematological:  Does not bruise/bleed easily.   Psychiatric/Behavioral:  Negative for agitation and confusion.      Objective:     Vital Signs (Most Recent):  Temp: 98.1 °F (36.7 °C) (12/28/23 0417)  Pulse: 98 (12/28/23 0417)  Resp: 16 (12/28/23 0417)  BP: (!) 119/59 (12/28/23 0417)  SpO2: 98 % (12/28/23 0417) Vital Signs (24h Range):  Temp:  [98.1 °F (36.7 °C)-100.5 °F (38.1 °C)] 98.1 °F (36.7 °C)  Pulse:  [] 98  Resp:  [16-20] 16  SpO2:  [96 %-100 %] 98 %  BP: (117-149)/(58-69) 119/59     Weight: 53 kg (116 lb 13.5 oz)  Body mass index is 20.7 kg/m².     Physical Exam  Vitals and nursing note reviewed.   Constitutional:       General: He is not in acute distress.     Appearance: Normal appearance. He is well-developed and normal weight. He is not ill-appearing, toxic-appearing  or diaphoretic.   HENT:      Head: Normocephalic and atraumatic.      Right Ear: External ear normal.      Left Ear: External ear normal.   Eyes:      General: No scleral icterus.        Right eye: No discharge.         Left eye: No discharge.      Conjunctiva/sclera: Conjunctivae normal.   Neck:      Vascular: No JVD.      Trachea: No tracheal deviation.   Cardiovascular:      Rate and Rhythm: Normal rate and regular rhythm.      Heart sounds: Normal heart sounds. No murmur heard.     No gallop.   Pulmonary:      Effort: Pulmonary effort is normal. No respiratory distress.      Breath sounds: Normal breath sounds. No stridor. No wheezing or rales.   Abdominal:      General: Bowel sounds are normal. There is no distension.      Palpations: Abdomen is soft. There is no mass.      Tenderness: There is abdominal tenderness (diffuse, greatest in RLQ). There is no guarding or rebound.   Musculoskeletal:         General: No deformity. Normal range of motion.      Cervical back: Normal range of motion and neck supple.   Skin:     General: Skin is warm and dry.   Neurological:      General: No focal deficit present.      Mental Status: He is alert and oriented to person, place, and time.      Cranial Nerves: No cranial nerve deficit.      Motor: No abnormal muscle tone.      Coordination: Coordination normal.   Psychiatric:         Mood and Affect: Mood normal.         Behavior: Behavior normal.         Thought Content: Thought content normal.         Judgment: Judgment normal.                Significant Labs: All pertinent labs within the past 24 hours have been reviewed.  BMP:   Recent Labs   Lab 12/27/23 1959         K 3.7      CO2 20*   BUN 12   CREATININE 0.8   CALCIUM 8.5*     CBC:   Recent Labs   Lab 12/27/23 1915   WBC 29.05*   HGB 14.7   HCT 42.9        CMP:   Recent Labs   Lab 12/27/23 1959      K 3.7      CO2 20*      BUN 12   CREATININE 0.8   CALCIUM 8.5*   PROT  "6.2   ALBUMIN 4.1   BILITOT 1.2*   ALKPHOS 41*   AST 16   ALT 12   ANIONGAP 12     Urine Culture: No results for input(s): "LABURIN" in the last 48 hours.  Urine Studies:   Recent Labs   Lab 12/27/23 2027   COLORU Colorless*   APPEARANCEUA Clear   PHUR 6.0   SPECGRAV 1.020   PROTEINUA Negative   GLUCUA Negative   KETONESU Negative   BILIRUBINUA Negative   OCCULTUA Negative   NITRITE Negative   UROBILINOGEN Negative   LEUKOCYTESUR Negative       Significant Imaging: I have reviewed all pertinent imaging results/findings within the past 24 hours.  Imaging Results              CT Abdomen Pelvis With IV Contrast NO Oral Contrast (Final result)  Result time 12/27/23 20:13:38      Final result by Josh Good MD (12/27/23 20:13:38)                   Impression:      Mild dilated appendix measuring 9 mm with two appendicoliths near the appendiceal origin.  Findings may reflect early acute appendicitis.  No significant surrounding inflammatory changes are appreciated.      Electronically signed by: Josh Good MD  Date:    12/27/2023  Time:    20:13               Narrative:    EXAMINATION:  CT ABDOMEN PELVIS WITH IV CONTRAST    CLINICAL HISTORY:  RLQ abdominal pain (Age >= 14y);    TECHNIQUE:  Low dose axial images, sagittal and coronal reformations were obtained from the lung bases to the pubic symphysis following the IV administration of 75 mL of Omnipaque 350 .  Oral contrast was not given.    COMPARISON:  None.    FINDINGS:  The visualized portion of the heart is unremarkable.  The lung bases are clear.    No significant hepatic abnormalities are identified.  There is no intra-or extrahepatic biliary ductal dilatation.  The gallbladder is unremarkable.  The stomach, pancreas, spleen, and adrenal glands are unremarkable.    Kidneys enhance normally with no evidence of hydronephrosis.  Ureters are unable to be tracked.  Urinary bladder is unremarkable.    Appendix is mildly dilated measuring 9 mm with two " appendicoliths seen near the appendiceal origin.  No significant surrounding inflammatory changes are appreciated.  No evidence of abscess or perforation.  The visualized loops of small and large bowel show no evidence of obstruction or inflammation.  No free air or free fluid.    Aorta tapers normally.    No acute osseous abnormality identified. Subcutaneous soft tissues show no significant abnormalities.                                       Assessment/Plan:     * Acute appendicitis  - Mr. Blayne Dai presents with RLQ abdominal pain   - General Surgery consulted   - continue zosyn   - continue PRN analgesics and antiemetics       Bipolar 1 disorder, mixed  - continue home medications         VTE Risk Mitigation (From admission, onward)           Ordered     IP VTE LOW RISK PATIENT  Once         12/27/23 2046     Place sequential compression device  Until discontinued         12/27/23 2046                    Josy Blake PA-C  Department of Hospital Medicine  [facility] - Emergency Dept

## 2023-12-28 NOTE — ED PROVIDER NOTES
Encounter Date: 12/27/2023    SCRIBE #1 NOTE: I, Saumya Stone, am scribing for, and in the presence of,  Joellen Delgado MD. I have scribed the following portions of the note - Other sections scribed: HPI, ROS, physical exam.       History     Chief Complaint   Patient presents with    Abdominal Pain     RLQ pain since last night w/ one episode of vomiting  Evaluated at Urgent care today and was sent here to rule out Acute Appendicitis or other Acute Abd Issues.        This is a 23 y.o. male, with no significant PMHx, who presents with complaint of abdominal pain with onset last night. He reports that the pain began in the center of his abdomen, but has since moved to the RLQ. He notes that the pain worsens with walking. He adds that while in the car, hitting bumps in the road aggravates the pain. He also reports a fever this morning, which he states was around 100.4 F. He adds that he had some nausea and vomiting this morning. He states that he was seen by urgent care, and was advised to come to the ED due to concern for appendicitis.    This is the extent of the patient's complaints at this time.          The history is provided by the patient.     Review of patient's allergies indicates:  No Known Allergies  Past Medical History:   Diagnosis Date    Congenital velopharyngeal insufficiency     Growth hormone deficiency     Otitis media     Submucous cleft palate      Past Surgical History:   Procedure Laterality Date    COCCYX FRACTURE SURGERY  2017    PHARYNGEAL FLAP      polyp removal      from both ears at 18 mos    submucous cleft palate      TYMPANOSTOMY TUBE PLACEMENT      3rd set     Family History   Adopted: Yes   Problem Relation Age of Onset    Alcohol abuse Mother         biological mom     Social History     Tobacco Use    Smoking status: Never    Smokeless tobacco: Never   Substance Use Topics    Alcohol use: Yes    Drug use: No     Review of Systems   Constitutional:  Positive for fever. Negative for  unexpected weight change.   HENT:  Negative for nosebleeds.    Eyes:  Negative for pain.   Respiratory:  Negative for apnea.    Cardiovascular:  Negative for palpitations.   Gastrointestinal:  Positive for abdominal pain, nausea and vomiting. Negative for constipation.   Genitourinary:  Negative for enuresis.   Skin:  Negative for pallor.   Hematological:  Does not bruise/bleed easily.   Psychiatric/Behavioral:  Negative for sleep disturbance.        Physical Exam     Initial Vitals [12/27/23 1817]   BP Pulse Resp Temp SpO2   128/60 97 17 98.6 °F (37 °C) 99 %      MAP       --         Physical Exam    Nursing note and vitals reviewed.  Constitutional: He appears well-developed and well-nourished. He does not have a sickly appearance. No distress.   HENT:   Head: Normocephalic and atraumatic.   Right Ear: External ear normal.   Left Ear: External ear normal.   Eyes: Conjunctivae, EOM and lids are normal. Right eye exhibits no discharge. Left eye exhibits no discharge. Right conjunctiva is not injected. Right conjunctiva has no hemorrhage. Left conjunctiva is not injected. Left conjunctiva has no hemorrhage. No scleral icterus.   Neck: Phonation normal. No stridor present. No tracheal deviation present.   Normal range of motion.  Cardiovascular:  Regular rhythm and normal heart sounds.   Tachycardia present.   Exam reveals no friction rub.       No murmur heard.  Pulses:       Radial pulses are 2+ on the right side and 2+ on the left side.        Dorsalis pedis pulses are 2+ on the right side and 2+ on the left side.   Pulmonary/Chest: Breath sounds normal. No respiratory distress. He has no wheezes. He has no rhonchi. He has no rales.   Abdominal:   LUQ and RLQ tenderness to palpation. No rebound or guarding.   Musculoskeletal:      Cervical back: Normal range of motion.     Neurological: He is alert and oriented to person, place, and time. He has normal strength. GCS eye subscore is 4. GCS verbal subscore is 5. GCS  motor subscore is 6.   Skin: Skin is warm.   Psychiatric: He has a normal mood and affect. His speech is normal and behavior is normal. Judgment and thought content normal. Cognition and memory are normal.         ED Course   Procedures  Labs Reviewed   CBC W/ AUTO DIFFERENTIAL - Abnormal; Notable for the following components:       Result Value    WBC 29.05 (*)     Gran % 76.0 (*)     Lymph % 8.0 (*)     All other components within normal limits   CULTURE, BLOOD   CULTURE, BLOOD   HIV 1 / 2 ANTIBODY    Narrative:     Release to patient->Immediate   HEPATITIS C ANTIBODY    Narrative:     Release to patient->Immediate   URINALYSIS, REFLEX TO URINE CULTURE   COMPREHENSIVE METABOLIC PANEL   LACTIC ACID, PLASMA   ISTAT CREATININE          Imaging Results              CT Abdomen Pelvis With IV Contrast NO Oral Contrast (Final result)  Result time 12/27/23 20:13:38      Final result by Josh Good MD (12/27/23 20:13:38)                   Impression:      Mild dilated appendix measuring 9 mm with two appendicoliths near the appendiceal origin.  Findings may reflect early acute appendicitis.  No significant surrounding inflammatory changes are appreciated.      Electronically signed by: Josh Good MD  Date:    12/27/2023  Time:    20:13               Narrative:    EXAMINATION:  CT ABDOMEN PELVIS WITH IV CONTRAST    CLINICAL HISTORY:  RLQ abdominal pain (Age >= 14y);    TECHNIQUE:  Low dose axial images, sagittal and coronal reformations were obtained from the lung bases to the pubic symphysis following the IV administration of 75 mL of Omnipaque 350 .  Oral contrast was not given.    COMPARISON:  None.    FINDINGS:  The visualized portion of the heart is unremarkable.  The lung bases are clear.    No significant hepatic abnormalities are identified.  There is no intra-or extrahepatic biliary ductal dilatation.  The gallbladder is unremarkable.  The stomach, pancreas, spleen, and adrenal glands are  unremarkable.    Kidneys enhance normally with no evidence of hydronephrosis.  Ureters are unable to be tracked.  Urinary bladder is unremarkable.    Appendix is mildly dilated measuring 9 mm with two appendicoliths seen near the appendiceal origin.  No significant surrounding inflammatory changes are appreciated.  No evidence of abscess or perforation.  The visualized loops of small and large bowel show no evidence of obstruction or inflammation.  No free air or free fluid.    Aorta tapers normally.    No acute osseous abnormality identified. Subcutaneous soft tissues show no significant abnormalities.                                       Medications   piperacillin-tazobactam (ZOSYN) 4.5 g in dextrose 5 % in water (D5W) 100 mL IVPB (MB+) (has no administration in time range)   ondansetron injection 4 mg (4 mg Intravenous Given 12/27/23 1915)   0.9%  NaCl infusion (1,000 mLs Intravenous New Bag 12/27/23 1911)   morphine injection 2 mg (2 mg Intravenous Given 12/27/23 1915)   iohexoL (OMNIPAQUE 350) injection 75 mL (75 mLs Intravenous Given 12/27/23 1950)     Medical Decision Making  Well-appearing, healthy 23-year-old male presents hemodynamically stable with complaint of mid abdominal pain that has now migrated predominantly to the right lower quadrant.  He did have significant tenderness in his region on exam.  Workup significant for a leukocytosis of 29,000 with a left shift.  CT shows a mildly enlarged appendix with 2 appendicoliths concerning for early appendicitis.  The patient does fit this picture.  Will give dose of Zosyn and admit to hospital medicine.  The case has been discussed with Dr. Flores, and he will likely be his 2nd case tomorrow.    Amount and/or Complexity of Data Reviewed  Labs: ordered.  Radiology: ordered.    Risk  Prescription drug management.            Scribe Attestation:   Scribe #1: I performed the above scribed service and the documentation accurately describes the services I  performed. I attest to the accuracy of the note.                           Physician Attestation for Scribe: I, Joellen Delgado  , reviewed documentation as scribed in my presence, which is both accurate and complete.      Clinical Impression:  Final diagnoses:  [K35.80] Acute appendicitis, unspecified acute appendicitis type (Primary)          ED Disposition Condition    Observation Stable                Joellen Delgado MD  12/27/23 2025

## 2023-12-28 NOTE — PLAN OF CARE
Patients PCP is correct on the face sheet. [facility] choice pharmacy is bedside. Sw scheduled patient a hospital follow up appointment. Patient's family will transport the patient home at discharge.    [facility] ([address])  Discharge Final Note    Primary Care Provider: Pietro Stinson MD    Expected Discharge Date: 12/28/2023    Final Discharge Note (most recent)       Final Note - 12/28/23 1601          Final Note    Assessment Type Final Discharge Note (P)      Anticipated Discharge Disposition Home or Self Care (P)      Hospital Resources/Appts/Education Provided Provided patient/caregiver with written discharge plan information;Appointments scheduled and added to AVS (P)         Post-Acute Status    Post-Acute Authorization Other (P)      Other Status No Post-Acute Service Needs (P)      Discharge Delays None known at this time (P)

## 2023-12-28 NOTE — HOSPITAL COURSE
Blayne Dai was admitted for acute appendicitis as seen on CT AP. Started on IV Zosyn, blood cultures taken which are no growth to date. Surgery consulted, appendectomy  done laparoscopically 12/28.  Surgery recommended short course of antibiotics at discharge.  Stable for discharge with Cipro and Flagyl for 5 days, oxycodone p.r.n. pain and surgery follow-up.  Return precautions discussed no further questions at discharge

## 2023-12-28 NOTE — ED TRIAGE NOTES
Blayne FARIAS Suhas, an 23 y.o. male presents to the ED with RLW pain that started that started today      Chief Complaint   Patient presents with    Abdominal Pain     RLQ pain since last night w/ one episode of vomiting  Evaluated at Urgent care today and was sent here to rule out Acute Appendicitis or other Acute Abd Issues.      Review of patient's allergies indicates:  No Known Allergies  Past Medical History:   Diagnosis Date    Congenital velopharyngeal insufficiency     Growth hormone deficiency     Otitis media     Submucous cleft palate

## 2023-12-28 NOTE — ANESTHESIA POSTPROCEDURE EVALUATION
Anesthesia Post Evaluation    Patient: Blayne Dai    Procedure(s) Performed: Procedure(s) (LRB):  APPENDECTOMY, LAPAROSCOPIC (N/A)    Final Anesthesia Type: general      Patient location during evaluation: PACU  Patient participation: Yes- Able to Participate  Level of consciousness: awake and alert  Post-procedure vital signs: reviewed and stable  Pain management: adequate  Airway patency: patent    PONV status at discharge: No PONV  Anesthetic complications: no      Cardiovascular status: blood pressure returned to baseline  Respiratory status: spontaneous ventilation  Hydration status: euvolemic  Follow-up not needed.              Vitals Value Taken Time   /58 12/28/23 1201   Temp 36.6 °C (97.8 °F) 12/28/23 1155   Pulse 80 12/28/23 1207   Resp 16 12/28/23 1145   SpO2 98 % 12/28/23 1207   Vitals shown include unvalidated device data.      Event Time   Out of Recovery 12/28/2023 12:08:00         Pain/Jacki Score: Pain Rating Prior to Med Admin: 8 (12/27/2023  9:41 PM)  Pain Rating Post Med Admin: 2 (12/27/2023 10:11 PM)  Jacki Score: 10 (12/28/2023 11:55 AM)

## 2023-12-28 NOTE — SUBJECTIVE & OBJECTIVE
No current facility-administered medications on file prior to encounter.     Current Outpatient Medications on File Prior to Encounter   Medication Sig    ARIPiprazole (ABILIFY) 5 MG Tab Take 1 tablet (5 mg total) by mouth once daily.    busPIRone (BUSPAR) 5 MG Tab Take 1 tablet (5 mg total) by mouth 2 (two) times daily.       Review of patient's allergies indicates:  No Known Allergies    Past Medical History:   Diagnosis Date    Congenital velopharyngeal insufficiency     Growth hormone deficiency     Otitis media     Submucous cleft palate      Past Surgical History:   Procedure Laterality Date    COCCYX FRACTURE SURGERY  2017    PHARYNGEAL FLAP      polyp removal      from both ears at 18 mos    submucous cleft palate      TYMPANOSTOMY TUBE PLACEMENT      3rd set     Family History       Problem Relation (Age of Onset)    Alcohol abuse Mother          Tobacco Use    Smoking status: Never    Smokeless tobacco: Never   Substance and Sexual Activity    Alcohol use: Yes    Drug use: No    Sexual activity: Never     Review of Systems   Constitutional:  Negative for appetite change, fatigue, fever and unexpected weight change.   HENT:  Negative for sore throat and trouble swallowing.    Eyes: Negative.    Respiratory:  Negative for cough, shortness of breath and wheezing.    Cardiovascular:  Negative for chest pain and leg swelling.   Gastrointestinal:  Positive for abdominal pain. Negative for abdominal distention, blood in stool, constipation, diarrhea, nausea and vomiting.   Endocrine: Negative.    Genitourinary: Negative.    Musculoskeletal:  Negative for back pain.   Skin: Negative.  Negative for rash.   Allergic/Immunologic: Negative.    Neurological: Negative.    Hematological: Negative.    Psychiatric/Behavioral:  Negative for confusion.      Objective:     Vital Signs (Most Recent):  Temp: 98.1 °F (36.7 °C) (12/28/23 0417)  Pulse: 98 (12/28/23 0417)  Resp: 16 (12/28/23 0417)  BP: (!) 119/59 (12/28/23  0417)  SpO2: 98 % (12/28/23 0417) Vital Signs (24h Range):  Temp:  [98.1 °F (36.7 °C)-100.5 °F (38.1 °C)] 98.1 °F (36.7 °C)  Pulse:  [] 98  Resp:  [16-20] 16  SpO2:  [96 %-100 %] 98 %  BP: (117-149)/(58-69) 119/59     Weight: 53 kg (116 lb 13.5 oz)  Body mass index is 20.7 kg/m².     Physical Exam  Vitals and nursing note reviewed.   Constitutional:       Appearance: He is well-developed.   HENT:      Head: Normocephalic and atraumatic.   Cardiovascular:      Rate and Rhythm: Normal rate.      Heart sounds: Normal heart sounds.   Pulmonary:      Effort: Pulmonary effort is normal.   Abdominal:      General: Bowel sounds are normal. There is no distension.      Palpations: Abdomen is soft.      Tenderness: There is abdominal tenderness.   Musculoskeletal:         General: Normal range of motion.      Cervical back: Normal range of motion.   Skin:     General: Skin is warm and dry.      Capillary Refill: Capillary refill takes less than 2 seconds.   Neurological:      Mental Status: He is alert and oriented to person, place, and time.   Psychiatric:         Behavior: Behavior normal.            I have reviewed all pertinent lab results within the past 24 hours.  CBC:   Recent Labs   Lab 12/28/23  0511   WBC 22.84*   RBC 4.76   HGB 14.0   HCT 39.8*      MCV 84   MCH 29.4   MCHC 35.2     CMP:   Recent Labs   Lab 12/27/23 1959 12/28/23  0511    102   CALCIUM 8.5* 9.0   ALBUMIN 4.1  --    PROT 6.2  --     138   K 3.7 3.6   CO2 20* 24    103   BUN 12 11   CREATININE 0.8 0.8   ALKPHOS 41*  --    ALT 12  --    AST 16  --    BILITOT 1.2*  --        Significant Diagnostics:  I have reviewed all pertinent imaging results/findings within the past 24 hours.  CT abdomen pelvis:  Acute appendicitis

## 2023-12-28 NOTE — INTERVAL H&P NOTE
The patient has been examined and the H&P has been reviewed:    I concur with the findings and no changes have occurred since H&P was written.        Active Hospital Problems    Diagnosis  POA    *Acute appendicitis [K35.80]  Yes    Bipolar 1 disorder, mixed [F31.60]  Yes     Dx: 2019    Pregeant    Previous therapies  Abilify          Resolved Hospital Problems   No resolved problems to display.

## 2023-12-28 NOTE — OP NOTE
DATE OF PROCEDURE: 12/28/2023      PREOPERATIVE DIAGNOSIS: Acute appendicitis with purulence    POSTOPERATIVE DIAGNOSIS: Acute appendicitis.     PROCEDURE PERFORMED: Laparoscopic appendectomy with drainage of pelvic abscess    ATTENDING SURGEON: Manny Flores MD.       ANESTHESIA: General endotracheal.     ESTIMATED BLOOD LOSS: 5 mL.     FINDINGS: Acute non- perforated appendicitis with purulence    SPECIMENs: Appendix.     DRAINS: None.     COMPLICATIONS: None.     OPERATIVE PROCEDURE: The patient was identified in Preoperative Holding and  brought back to the Operating Room. Placed supine on the operating table and padded appropriately. Monitors were applied and there was smooth induction of general endotracheal anesthesia. A Zelaya catheter was placed. The patient's abdomen was prepped and draped in the standard sterile surgical fashion. A time-out was performed and all team members present agreed this was the correct procedure on the correct patient. We also confirmed administration of appropriate preoperative antibiotics.    Skin was grasped with penetrating towel clip and elevated and a veress needle was used to enter the peritoneum. A 1.5cm umbilical skin incision was made. The abdomen was entered using a 5mm bladed trocar. The abdomen was insufflated with carbon dioxide to a maximum pressure of 15 mmHg. A 5-mm laparoscope was placed and the abdomen was examined. There was no evidence of injury from the initial trocar placement. Two 5-mm trocars were placed under direct vision through separate stab incisions, one in the suprapubic area avoiding the dome of the bladder and one in the left lower quadrant avoiding the inferior epigastric artery. We also exchanged our umbilical trocar for an 11mm trocar. We directed our attention to the right lower quadrant. The appendix was identified and noted to have  significant inflammatory change without evidence of perforation. There was some purulent fluid in the  right lower quadrant and the pelvis.The appendix was elevated. A mesenteric defect was made at the base of the appendix using the [place] dissector. The appendix was divided at the base using the Endo-MARIA ESTHER stapler with a blue (45-3.5) load. The mesoappendix was then divided with two white (45-2.5) loads of the stapler. The appendix was placed into an Endocatch bag and removed from the umbilical site. We returned the laparoscope and trocar to the umbilicus and reexamined the right lower quadrant. The staple line on the mesoappendix was examined and no bleeding was noted. The base of the appendix was examined and appeared viable and well-sealed. The right lower quadrant was suctioned as well as the pelvis. . All ports were removed under direct vision and no bleeding from any port site was noted. The insufflation of the abdomen was evacuated and the laparoscopewas removed. The fascial incision at the umbilical port site was closed with 0 Vicryl stitch in a figure of eight fashion. All port sites were infiltrated with Lidocaine and closed in a subcuticular fashion. Skin glue was placed. The patient's Zelaya catheter was removed. The patient was extubated in the Operating Room and transported to the Recovery Room in stable condition. All sponge, instrument and needle counts were correct at the end of the case. I was present and scrubbed for the entire procedure.

## 2023-12-28 NOTE — ASSESSMENT & PLAN NOTE
23-year-old male with acute appendicitis   To OR today for laparoscopic appendectomy   All risks and benefits discussed   Continue IV antibiotics.

## 2023-12-28 NOTE — PROGRESS NOTES
[facility] Surg [address] Medicine  Progress Note    Patient Name: Blayne Dai  MRN: [id number]  Patient Class: OP- Observation   Admission Date: 12/27/2023  Length of Stay: 0 days  Attending Physician: Austin Muniz MD  Primary Care Provider: Pietro Stinson MD        Subjective:     Principal Problem:Acute appendicitis        HPI:  Mr. Blayne Dai is a 23 y.o. male, with PMH of Bipolr I, who [place] [facility] [place] due to RLQ abdominal pain beginning 1 night PTA. The pain began in the center of his abdomen, and then moved to the RLQ. He states the pain is worse with walking, or when going over bumpy roads while driving. He notes associated fever of 100.4F, nausea, and one episode of vomiting. He initially presented to Urgent Care, and was sent to the ED for further evaluation. ED labs revealed leukocytosis of 29K, with left shift. A CT of the abdomen and pelvis showed mild dilation of the appendix at 9 mm with two appendicoliths. He was treated in the ED with zosyn. He was placed on observation pending surgical evaluation in the AM.     Overview/Hospital Course:  Blayne Dai was admitted for acute appendicitis as seen on CT AP. Started on IV Zosyn, blood cultures taken. Surgery consulted, appendectomy planned for 12/28.     Interval History: Seen at bedside with mother present, reports improvement in pain. Bowel sounds positive, plan to go to OR today for appendectomy    Review of Systems   Constitutional:  Negative for diaphoresis and fever.   Respiratory:  Negative for cough and shortness of breath.    Cardiovascular:  Negative for chest pain and palpitations.   Gastrointestinal:  Positive for abdominal pain, nausea and vomiting. Negative for diarrhea.   Genitourinary:  Negative for difficulty urinating and dysuria.   Musculoskeletal:  Negative for back pain.   Neurological:  Negative for dizziness, syncope and weakness.   Psychiatric/Behavioral:  Negative for agitation  and confusion.      Objective:     Vital Signs (Most Recent):  Temp: 98.7 °F (37.1 °C) (12/28/23 0813)  Pulse: 88 (12/28/23 0813)  Resp: 18 (12/28/23 0813)  BP: 110/62 (12/28/23 0813)  SpO2: 97 % (12/28/23 0813) Vital Signs (24h Range):  Temp:  [98.1 °F (36.7 °C)-100.5 °F (38.1 °C)] 98.7 °F (37.1 °C)  Pulse:  [] 88  Resp:  [16-20] 18  SpO2:  [96 %-100 %] 97 %  BP: (110-149)/(58-69) 110/62     Weight: 53 kg (116 lb 13.5 oz)  Body mass index is 20.7 kg/m².    Intake/Output Summary (Last 24 hours) at 12/28/2023 1014  Last data filed at 12/27/2023 2120  Gross per 24 hour   Intake 1100 ml   Output --   Net 1100 ml         Physical Exam  Vitals and nursing note reviewed.   Constitutional:       General: He is not in acute distress.     Appearance: Normal appearance. He is well-developed.   HENT:      Head: Normocephalic and atraumatic.   Eyes:      General:         Right eye: No discharge.         Left eye: No discharge.   Neck:      Vascular: No JVD.      Trachea: No tracheal deviation.   Cardiovascular:      Rate and Rhythm: Normal rate.      Heart sounds: No murmur heard.  Pulmonary:      Effort: Pulmonary effort is normal. No respiratory distress.   Abdominal:      General: Abdomen is flat. There is no distension.      Palpations: Abdomen is soft.      Tenderness: There is abdominal tenderness (diffuse, greatest in RLQ).   Musculoskeletal:         General: Normal range of motion.   Skin:     General: Skin is warm and dry.   Neurological:      General: No focal deficit present.      Mental Status: He is alert.      Motor: No abnormal muscle tone.   Psychiatric:         Mood and Affect: Mood normal.         Behavior: Behavior normal.             Significant Labs: All pertinent labs within the past 24 hours have been reviewed.    Significant Imaging: I have reviewed all pertinent imaging results/findings within the past 24 hours.    Assessment/Plan:      * Acute appendicitis  - Mr. Blayne Dai presents with RLQ  abdominal pain   - General Surgery consulted, plan for appendectomy on 12/28  - continue zosyn   - continue PRN analgesics and antiemetics   - surgery referral at discharge      Bipolar 1 disorder, mixed  - continue home medications         VTE Risk Mitigation (From admission, onward)           Ordered     IP VTE LOW RISK PATIENT  Once         12/27/23 2046     Place sequential compression device  Until discontinued         12/27/23 2046                    Discharge Planning   KARLA:      Code Status: Full Code   Is the patient medically ready for discharge?:     Reason for patient still in hospital (select all that apply): Patient trending condition  Discharge Plan A: Home, Home with family                  Leonarda Stephens PA-C  Department of Hospital Medicine   [facility] ([address])

## 2023-12-28 NOTE — ASSESSMENT & PLAN NOTE
- Mr. Blayne Dai presents with RLQ abdominal pain   - General Surgery consulted, plan for appendectomy on 12/28  - continue zosyn   - continue PRN analgesics and antiemetics   - surgery referral at discharge

## 2023-12-28 NOTE — ANESTHESIA PROCEDURE NOTES
Intubation    Date/Time: 12/28/2023 10:32 AM    Performed by: Katie Mcfadden CRNA  Authorized by: Neeraj Powell MD    Intubation:     Induction:  Intravenous    Intubated:  Postinduction    Mask Ventilation:  Easy mask    Attempts:  1    Attempted By:  CRNA    Method of Intubation:  Video laryngoscopy    Blade:  Louis 3    Laryngeal View Grade: Grade I - full view of cords      Difficult Airway Encountered?: No      Complications:  None    Airway Device:  Oral endotracheal tube    Airway Device Size:  7.5    Style/Cuff Inflation:  Cuffed    Tube secured:  21    Secured at:  The lips    Placement Verified By:  Capnometry    Complicating Factors:  None    Findings Post-Intubation:  Atraumatic/condition of teeth unchanged and BS equal bilateral

## 2023-12-28 NOTE — HPI
Mr. Blayne Dai is a 23 y.o. male, with PMH of Bipolr I, who presented to [facility] [place] on 12/27/23 due to RLQ abdominal pain beginning 1 night PTA. The pain began in the center of his abdomen, and then moved to the RLQ. He states the pain is worse with walking, or when going over bumpy roads while driving. He notes associated fever of 100.4F, nausea, and one episode of vomiting. He initially presented to Urgent Care, and was sent to the ED for further evaluation. ED labs revealed leukocytosis of 29K, with left shift. A CT of the abdomen and pelvis showed mild dilation of the appendix at 9 mm with two appendicoliths. He was treated in the ED with zosyn. He was placed on observation pending surgical evaluation in the AM.

## 2023-12-28 NOTE — SUBJECTIVE & OBJECTIVE
Past Medical History:   Diagnosis Date    Congenital velopharyngeal insufficiency     Growth hormone deficiency     Otitis media     Submucous cleft palate        Past Surgical History:   Procedure Laterality Date    COCCYX FRACTURE SURGERY  2017    PHARYNGEAL FLAP      polyp removal      from both ears at 18 mos    submucous cleft palate      TYMPANOSTOMY TUBE PLACEMENT      3rd set       Review of patient's allergies indicates:  No Known Allergies    No current facility-administered medications on file prior to encounter.     Current Outpatient Medications on File Prior to Encounter   Medication Sig    ARIPiprazole (ABILIFY) 5 MG Tab Take 1 tablet (5 mg total) by mouth once daily.    busPIRone (BUSPAR) 5 MG Tab Take 1 tablet (5 mg total) by mouth 2 (two) times daily.     Family History       Problem Relation (Age of Onset)    Alcohol abuse Mother          Tobacco Use    Smoking status: Never    Smokeless tobacco: Never   Substance and Sexual Activity    Alcohol use: Yes    Drug use: No    Sexual activity: Never     Review of Systems   Constitutional:  Negative for chills, diaphoresis and fever.   Respiratory:  Negative for cough, shortness of breath and wheezing.    Cardiovascular:  Negative for chest pain and palpitations.   Gastrointestinal:  Positive for abdominal pain, nausea and vomiting. Negative for constipation and diarrhea.   Genitourinary:  Negative for decreased urine volume, difficulty urinating, dysuria, frequency, hematuria and urgency.   Musculoskeletal:  Negative for back pain, neck pain and neck stiffness.   Skin:  Negative for rash and wound.   Neurological:  Negative for dizziness, syncope, weakness, light-headedness and headaches.   Hematological:  Does not bruise/bleed easily.   Psychiatric/Behavioral:  Negative for agitation and confusion.      Objective:     Vital Signs (Most Recent):  Temp: 98.1 °F (36.7 °C) (12/28/23 0417)  Pulse: 98 (12/28/23 0417)  Resp: 16 (12/28/23 0417)  BP: (!)  119/59 (12/28/23 0417)  SpO2: 98 % (12/28/23 0417) Vital Signs (24h Range):  Temp:  [98.1 °F (36.7 °C)-100.5 °F (38.1 °C)] 98.1 °F (36.7 °C)  Pulse:  [] 98  Resp:  [16-20] 16  SpO2:  [96 %-100 %] 98 %  BP: (117-149)/(58-69) 119/59     Weight: 53 kg (116 lb 13.5 oz)  Body mass index is 20.7 kg/m².     Physical Exam  Vitals and nursing note reviewed.   Constitutional:       General: He is not in acute distress.     Appearance: Normal appearance. He is well-developed and normal weight. He is not ill-appearing, toxic-appearing or diaphoretic.   HENT:      Head: Normocephalic and atraumatic.      Right Ear: External ear normal.      Left Ear: External ear normal.   Eyes:      General: No scleral icterus.        Right eye: No discharge.         Left eye: No discharge.      Conjunctiva/sclera: Conjunctivae normal.   Neck:      Vascular: No JVD.      Trachea: No tracheal deviation.   Cardiovascular:      Rate and Rhythm: Normal rate and regular rhythm.      Heart sounds: Normal heart sounds. No murmur heard.     No gallop.   Pulmonary:      Effort: Pulmonary effort is normal. No respiratory distress.      Breath sounds: Normal breath sounds. No stridor. No wheezing or rales.   Abdominal:      General: Bowel sounds are normal. There is no distension.      Palpations: Abdomen is soft. There is no mass.      Tenderness: There is abdominal tenderness (diffuse, greatest in RLQ). There is no guarding or rebound.   Musculoskeletal:         General: No deformity. Normal range of motion.      Cervical back: Normal range of motion and neck supple.   Skin:     General: Skin is warm and dry.   Neurological:      General: No focal deficit present.      Mental Status: He is alert and oriented to person, place, and time.      Cranial Nerves: No cranial nerve deficit.      Motor: No abnormal muscle tone.      Coordination: Coordination normal.   Psychiatric:         Mood and Affect: Mood normal.         Behavior: Behavior normal.     "     Thought Content: Thought content normal.         Judgment: Judgment normal.                Significant Labs: All pertinent labs within the past 24 hours have been reviewed.  BMP:   Recent Labs   Lab 12/27/23 1959         K 3.7      CO2 20*   BUN 12   CREATININE 0.8   CALCIUM 8.5*     CBC:   Recent Labs   Lab 12/27/23 1915   WBC 29.05*   HGB 14.7   HCT 42.9        CMP:   Recent Labs   Lab 12/27/23 1959      K 3.7      CO2 20*      BUN 12   CREATININE 0.8   CALCIUM 8.5*   PROT 6.2   ALBUMIN 4.1   BILITOT 1.2*   ALKPHOS 41*   AST 16   ALT 12   ANIONGAP 12     Urine Culture: No results for input(s): "LABURIN" in the last 48 hours.  Urine Studies:   Recent Labs   Lab 12/27/23 2027   COLORU Colorless*   APPEARANCEUA Clear   PHUR 6.0   SPECGRAV 1.020   PROTEINUA Negative   GLUCUA Negative   KETONESU Negative   BILIRUBINUA Negative   OCCULTUA Negative   NITRITE Negative   UROBILINOGEN Negative   LEUKOCYTESUR Negative       Significant Imaging: I have reviewed all pertinent imaging results/findings within the past 24 hours.  Imaging Results              CT Abdomen Pelvis With IV Contrast NO Oral Contrast (Final result)  Result time 12/27/23 20:13:38      Final result by Josh Good MD (12/27/23 20:13:38)                   Impression:      Mild dilated appendix measuring 9 mm with two appendicoliths near the appendiceal origin.  Findings may reflect early acute appendicitis.  No significant surrounding inflammatory changes are appreciated.      Electronically signed by: Josh Good MD  Date:    12/27/2023  Time:    20:13               Narrative:    EXAMINATION:  CT ABDOMEN PELVIS WITH IV CONTRAST    CLINICAL HISTORY:  RLQ abdominal pain (Age >= 14y);    TECHNIQUE:  Low dose axial images, sagittal and coronal reformations were obtained from the lung bases to the pubic symphysis following the IV administration of 75 mL of Omnipaque 350 .  Oral contrast was not " given.    COMPARISON:  None.    FINDINGS:  The visualized portion of the heart is unremarkable.  The lung bases are clear.    No significant hepatic abnormalities are identified.  There is no intra-or extrahepatic biliary ductal dilatation.  The gallbladder is unremarkable.  The stomach, pancreas, spleen, and adrenal glands are unremarkable.    Kidneys enhance normally with no evidence of hydronephrosis.  Ureters are unable to be tracked.  Urinary bladder is unremarkable.    Appendix is mildly dilated measuring 9 mm with two appendicoliths seen near the appendiceal origin.  No significant surrounding inflammatory changes are appreciated.  No evidence of abscess or perforation.  The visualized loops of small and large bowel show no evidence of obstruction or inflammation.  No free air or free fluid.    Aorta tapers normally.    No acute osseous abnormality identified. Subcutaneous soft tissues show no significant abnormalities.

## 2023-12-28 NOTE — HOSPITAL COURSE
Patient still with right lower quadrant pain.    Mostly tender to palpation on deep pressure.    No nausea or vomiting.    Discussed surgical intervention with laparoscopic appendectomy.

## 2023-12-28 NOTE — ANESTHESIA PREPROCEDURE EVALUATION
12/28/2023  Blayne Dai is a 23 y.o., male.      Pre-op Assessment    I have reviewed the Patient Summary Reports.     I have reviewed the Nursing Notes. I have reviewed the NPO Status.   I have reviewed the Medications.     Review of Systems  Anesthesia Hx:  No problems with previous Anesthesia             Denies Family Hx of Anesthesia complications.    Denies Personal Hx of Anesthesia complications.                    Social:  Non-Smoker       Hematology/Oncology:    Oncology Normal                                   EENT/Dental:   Cleft palate repair          Cardiovascular:  Cardiovascular Normal                                            Pulmonary:  Pulmonary Normal                       Renal/:  Renal/ Normal                 Hepatic/GI:  Hepatic/GI Normal                 Musculoskeletal:  Musculoskeletal Normal                Neurological:  Neurology Normal                                      Endocrine:  Endocrine Normal            Psych:     Bipolar Dz               Physical Exam  General: Well nourished and Cooperative    Airway:  Mallampati: II   Mouth Opening: Normal  TM Distance: Normal  Neck ROM: Normal ROM    Dental:  Intact        Anesthesia Plan  Type of Anesthesia, risks & benefits discussed:    Anesthesia Type: Gen ETT  Intra-op Monitoring Plan: Standard ASA Monitors  Post Op Pain Control Plan: multimodal analgesia and peripheral nerve block  Induction:  IV  Airway Plan: Video  Informed Consent: Informed consent signed with the Patient and all parties understand the risks and agree with anesthesia plan.  All questions answered.   ASA Score: 2 Emergent  Day of Surgery Review of History & Physical: H&P Update referred to the surgeon/provider.    Ready For Surgery From Anesthesia Perspective.     .

## 2023-12-29 ENCOUNTER — TELEPHONE (OUTPATIENT)
Dept: ORTHOPEDICS | Facility: CLINIC | Age: 23
End: 2023-12-29
Payer: COMMERCIAL

## 2023-12-29 NOTE — DISCHARGE SUMMARY
[facility] [address] Medicine  Discharge Summary      Patient Name: Blayne Dai  MRN: [id number]  GUMARO: [id number]  Patient Class: OP- Observation  Admission Date: 12/27/2023  Hospital Length of Stay: 0 days  Discharge Date and Time: 12/28/2023  5:56 PM  Attending Physician: No att. providers found   Discharging Provider: Leonarda Stephens PA-C  Primary Care Provider: Pietro Stinson MD    Primary Care Team: Networked reference to record PCT     HPI:   Mr. Blayne Dai is a 23 y.o. male, with PMH of Bipolr I, who [place] [facility] [place] due to RLQ abdominal pain beginning 1 night PTA. The pain began in the center of his abdomen, and then moved to the RLQ. He states the pain is worse with walking, or when going over bumpy roads while driving. He notes associated fever of 100.4F, nausea, and one episode of vomiting. He initially presented to Urgent Care, and was sent to the ED for further evaluation. ED labs revealed leukocytosis of 29K, with left shift. A CT of the abdomen and pelvis showed mild dilation of the appendix at 9 mm with two appendicoliths. He was treated in the ED with zosyn. He was placed on observation pending surgical evaluation in the AM.     Procedure(s) (LRB):  APPENDECTOMY, LAPAROSCOPIC (N/A)      Hospital Course:   Blayne Dai was admitted for acute appendicitis as seen on CT AP. Started on IV Zosyn, blood cultures taken which are no growth to date. Surgery consulted, appendectomy  done laparoscopically 12/28.  Surgery recommended short course of antibiotics at discharge.  Stable for discharge with Cipro and Flagyl for 5 days, oxycodone p.r.n. pain and surgery follow-up.  Return precautions discussed no further questions at discharge     Goals of Care Treatment Preferences:  Code Status: Full Code      Consults:     GI  * Acute appendicitis  - Mr. Blayne Dai presents with RLQ abdominal pain, found to have fever and   Elevated white count.   CT abdomen  and pelvis revealing acute appendicitis  - continue zosyn   - General Surgery consulted, plan for appendectomy on 12/28:  done laparoscopically, found acute non perforated appendicitis with purulence.  Recommending 5 day course of antibiotics.  - continue PRN analgesics and antiemetics   - surgery referral at discharge      Final Active Diagnoses:    Diagnosis Date Noted POA    PRINCIPAL PROBLEM:  Acute appendicitis [K35.80] 12/27/2023 Yes    Bipolar 1 disorder, mixed [F31.60] 09/04/2019 Yes      Problems Resolved During this Admission:       Discharged Condition: stable    Disposition: Home or Self Care    Follow Up:    Patient Instructions:      Ambulatory referral/consult to General Surgery   Standing Status: Future   Referral Priority: Routine Referral Type: Consultation   Referral Reason: Specialty Services Required   Requested Specialty: General Surgery   Number of Visits Requested: 1     Notify your health care provider if you experience any of the following:  temperature >100.4     Notify your health care provider if you experience any of the following:  severe uncontrolled pain     Notify your health care provider if you experience any of the following:  redness, tenderness, or signs of infection (pain, swelling, redness, odor or green/yellow discharge around incision site)     Notify your health care provider if you experience any of the following:  difficulty breathing or increased cough     Notify your health care provider if you experience any of the following:  severe persistent headache     Notify your health care provider if you experience any of the following:  worsening rash     Notify your health care provider if you experience any of the following:  persistent dizziness, light-headedness, or visual disturbances     Notify your health care provider if you experience any of the following:  increased confusion or weakness     Notify your health care provider if you experience any of the following:   persistent nausea and vomiting or diarrhea     Activity as tolerated       Significant Diagnostic Studies: Microbiology: Blood Culture   Lab Results   Component Value Date    LABBLOO No Growth to date 12/27/2023    LABBLOO No Growth to date 12/27/2023    LABBLOO No Growth to date 12/27/2023    LABBLOO No Growth to date 12/27/2023     Radiology: CT scan: CT ABDOMEN PELVIS WITH CONTRAST:   Results for orders placed or performed during the hospital encounter of 12/27/23   CT Abdomen Pelvis With IV Contrast NO Oral Contrast    Narrative    EXAMINATION:  CT ABDOMEN PELVIS WITH IV CONTRAST    CLINICAL HISTORY:  RLQ abdominal pain (Age >= 14y);    TECHNIQUE:  Low dose axial images, sagittal and coronal reformations were obtained from the lung bases to the pubic symphysis following the IV administration of 75 mL of Omnipaque 350 .  Oral contrast was not given.    COMPARISON:  None.    FINDINGS:  The visualized portion of the heart is unremarkable.  The lung bases are clear.    No significant hepatic abnormalities are identified.  There is no intra-or extrahepatic biliary ductal dilatation.  The gallbladder is unremarkable.  The stomach, pancreas, spleen, and adrenal glands are unremarkable.    Kidneys enhance normally with no evidence of hydronephrosis.  Ureters are unable to be tracked.  Urinary bladder is unremarkable.    Appendix is mildly dilated measuring 9 mm with two appendicoliths seen near the appendiceal origin.  No significant surrounding inflammatory changes are appreciated.  No evidence of abscess or perforation.  The visualized loops of small and large bowel show no evidence of obstruction or inflammation.  No free air or free fluid.    Aorta tapers normally.    No acute osseous abnormality identified. Subcutaneous soft tissues show no significant abnormalities.      Impression    Mild dilated appendix measuring 9 mm with two appendicoliths near the appendiceal origin.  Findings may reflect early acute  appendicitis.  No significant surrounding inflammatory changes are appreciated.      Electronically signed by: Josh Good MD  Date:    12/27/2023  Time:    20:13       Pending Diagnostic Studies:       Procedure Component Value Units Date/Time    Specimen to Pathology, Surgery General Surgery [[id number]] Collected: 12/28/23 1110    Order Status: Sent Lab Status: In process Updated: 12/28/23 1539    Specimen: Tissue            Medications:  Reconciled Home Medications:      Medication List        START taking these medications      ciprofloxacin HCl 500 MG tablet  Commonly known as: CIPRO  Take 1 tablet (500 mg total) by mouth every 12 (twelve) hours. for 5 days     metroNIDAZOLE 500 MG tablet  Commonly known as: FLAGYL  Take 1 tablet (500 mg total) by mouth every 8 (eight) hours. for 5 days     ondansetron 4 MG Tbdl  Commonly known as: ZOFRAN-ODT  Take 1 tablet (4 mg total) by mouth every 8 (eight) hours as needed.     oxyCODONE 5 mg Cap  Commonly known as: OXY-IR  Take 1 capsule (5 mg total) by mouth every 6 (six) hours as needed for Pain.            CONTINUE taking these medications      ARIPiprazole 5 MG Tab  Commonly known as: ABILIFY  Take 1 tablet (5 mg total) by mouth once daily.     busPIRone 5 MG Tab  Commonly known as: BUSPAR  Take 1 tablet (5 mg total) by mouth 2 (two) times daily.              Indwelling Lines/Drains at time of discharge:   Lines/Drains/Airways       None                   Time spent on the discharge of patient: >45 minutes         Leonarda Stephens PA-C  Department of Hospital Medicine  [facility] ([address])

## 2023-12-29 NOTE — ASSESSMENT & PLAN NOTE
- Mr. Blayne Dai presents with RLQ abdominal pain, found to have fever and   Elevated white count.   CT abdomen and pelvis revealing acute appendicitis  - continue zosyn   - General Surgery consulted, plan for appendectomy on 12/28:  done laparoscopically, found acute non perforated appendicitis with purulence.  Recommending 5 day course of antibiotics.  - continue PRN analgesics and antiemetics   - surgery referral at discharge

## 2024-01-02 LAB
BACTERIA BLD CULT: NORMAL
BACTERIA BLD CULT: NORMAL

## 2024-01-04 LAB
FINAL PATHOLOGIC DIAGNOSIS: NORMAL
Lab: NORMAL

## 2024-01-18 ENCOUNTER — TELEPHONE (OUTPATIENT)
Dept: ORTHOPEDICS | Facility: CLINIC | Age: 24
End: 2024-01-18
Payer: COMMERCIAL

## 2024-01-23 ENCOUNTER — OFFICE VISIT (OUTPATIENT)
Dept: SURGERY | Facility: CLINIC | Age: 24
End: 2024-01-23
Payer: COMMERCIAL

## 2024-01-23 DIAGNOSIS — K35.80 ACUTE APPENDICITIS, UNSPECIFIED ACUTE APPENDICITIS TYPE: ICD-10-CM

## 2024-01-23 PROCEDURE — 99024 POSTOP FOLLOW-UP VISIT: CPT | Mod: 95,,, | Performed by: SURGERY

## 2024-01-23 PROCEDURE — 1159F MED LIST DOCD IN RCRD: CPT | Mod: CPTII,95,, | Performed by: SURGERY

## 2024-01-23 PROCEDURE — 1160F RVW MEDS BY RX/DR IN RCRD: CPT | Mod: CPTII,95,, | Performed by: SURGERY

## 2024-01-26 NOTE — PROGRESS NOTES
Blayne Dai is a 23 y.o. male patient.   1. Acute appendicitis, unspecified acute appendicitis type    Two weeks status post laparoscopic appendectomy.    Patient doing well.    Pain well controlled.    Tolerating diet.    No nausea or vomiting.    Incisions healing well.      Past Medical History:   Diagnosis Date    Congenital velopharyngeal insufficiency     Growth hormone deficiency     Otitis media     Submucous cleft palate      No past surgical history pertinent negatives on file.  Scheduled Meds:  Continuous Infusions:  PRN Meds:    Review of patient's allergies indicates:  No Known Allergies  There are no hospital problems to display for this patient.    There were no vitals taken for this visit.    Subjective:   Diet: Adequate intake.  Patient reports no nausea.    Activity level: Returning to normal.    Pain control: Well controlled.    Objective:  Vital signs (most recent): There were no vitals taken for this visit.  General appearance: Comfortable.    Lungs:  Normal effort.    Heart: Normal rate.    Abdomen: Abdomen is soft.    Bowel sounds:  Bowel sounds are normal.    Tenderness: There is no abdominal tenderness tenderness.    Wound:  Clean.    Assessment & Plan  Status post laparoscopic appendectomy   Return to clinic [email or web address] Flores MD  1/25/2024

## 2024-02-14 ENCOUNTER — OFFICE VISIT (OUTPATIENT)
Dept: PSYCHIATRY | Facility: CLINIC | Age: 24
End: 2024-02-14
Payer: COMMERCIAL

## 2024-02-14 VITALS
SYSTOLIC BLOOD PRESSURE: 132 MMHG | WEIGHT: 116.75 LBS | DIASTOLIC BLOOD PRESSURE: 60 MMHG | HEART RATE: 68 BPM | BODY MASS INDEX: 20.68 KG/M2

## 2024-02-14 DIAGNOSIS — F39 MOOD DISORDER: Primary | ICD-10-CM

## 2024-02-14 DIAGNOSIS — F41.1 GAD (GENERALIZED ANXIETY DISORDER): ICD-10-CM

## 2024-02-14 PROCEDURE — 3078F DIAST BP <80 MM HG: CPT | Mod: CPTII,S$GLB,, | Performed by: STUDENT IN AN ORGANIZED HEALTH CARE EDUCATION/TRAINING PROGRAM

## 2024-02-14 PROCEDURE — 3008F BODY MASS INDEX DOCD: CPT | Mod: CPTII,S$GLB,, | Performed by: STUDENT IN AN ORGANIZED HEALTH CARE EDUCATION/TRAINING PROGRAM

## 2024-02-14 PROCEDURE — 99999 PR PBB SHADOW E&M-EST. PATIENT-LVL II: CPT | Mod: PBBFAC,,, | Performed by: STUDENT IN AN ORGANIZED HEALTH CARE EDUCATION/TRAINING PROGRAM

## 2024-02-14 PROCEDURE — 3075F SYST BP GE 130 - 139MM HG: CPT | Mod: CPTII,S$GLB,, | Performed by: STUDENT IN AN ORGANIZED HEALTH CARE EDUCATION/TRAINING PROGRAM

## 2024-02-14 PROCEDURE — 99214 OFFICE O/P EST MOD 30 MIN: CPT | Mod: S$GLB,,, | Performed by: STUDENT IN AN ORGANIZED HEALTH CARE EDUCATION/TRAINING PROGRAM

## 2024-02-14 RX ORDER — ARIPIPRAZOLE 5 MG/1
5 TABLET ORAL DAILY
Qty: 30 TABLET | Refills: 2 | Status: SHIPPED | OUTPATIENT
Start: 2024-02-14 | End: 2024-03-27 | Stop reason: SDUPTHER

## 2024-02-14 RX ORDER — SERTRALINE HYDROCHLORIDE 25 MG/1
TABLET, FILM COATED ORAL
Qty: 60 TABLET | Refills: 0 | Status: SHIPPED | OUTPATIENT
Start: 2024-02-14

## 2024-02-14 NOTE — PROGRESS NOTES
STAFF NOTE    The chart was reviewed and the case was discussed, including the assessment and management plan.  I agree with the overall findings, with corrections or clarifications, if any, noted herein.    **    Edin Braun MD  Board Certification: Psychiatry and Addiction Medicine

## 2024-02-14 NOTE — PROGRESS NOTES
10/7/2022 7:30 AM   Blayne Dai   2000   [id number]                                                                   OUTPATIENT PSYCHIATRY FOLLOW- UP VISIT     Reason for Encounter:  Blayne Dai, a 21 y.o. male,who presents today for follow up of anxiety, depression, hypomanic episodes. Met with patient.     Interval History and Content of Current Session:  Doing well since last visit. Wanted to follow-up before medication ran out. Looking to potentially to make some changes. Takes abilify, finds it helpful. Has not found buspar to be as effective for anxiety.     During freshman year of HS, was dating girl and during winter break - fell into a sudden depression at that time which lasted for a while. Unsure that he felt especially elevated, though did start seeing shadows and had bad delusions. Summer before sophomore year, started on abilify. Was feeling paranoid and seeing shadows even when on abilify initially. Extremely rare when on abilify. Was having a lot of racing thoughts and not sleeping at that time. Is adopted so not fully aware of family history aside from mental illness that he doesn't know the details of for his birth father. Does have little muscle twitches lately.     Mood has been okay, though having some college stress. Did feel depressed during the freshman year of college, though admits he wasn't taking his medications as much at that time. Has not tried any other medications. Appetite has been better, eating well. No restricting or purging.    Was adopted as baby, mother drank during first trimester.    Thinking about going into epidemiology at [employer]. Currently a sophomore. Freshman year was difficult but now in an apartment. Grades have been improved since then. Sleep has been much better. Goes to bed around ~11p, wakes up around 5a. Feels refreshed when he wakes up.    Psychosocial stressors: academic, family, social pressures        Review Of Systems:      Medical Review Of  Systems:  Pertinent items are noted in HPI.     Psychiatric Review Of Systems:  Sleep: no  appetite changes: no  weight changes: denies  energy/anergy: no  interest/pleasure/anhedonia: no  somatic symptoms: no  libido: no  anxiety/panic: no  guilty/hopeless: no  S.I.B.s/risky behavior: no  any drugs: no  alcohol: no     Risk Parameters:  Patient reports no suicidal ideation  Patient reports no homicidal ideation  Patient reports no self-injurious behavior  Patient reports no violent behavior        Current meds-  abilify     Compliance: yes     Side effects: None     Objective      ALL MEDICATIONS:     Current Outpatient Medications   Medication Instructions    ARIPiprazole (ABILIFY) 5 mg, Oral, Daily    ARIPiprazole (ABILIFY) 5 mg, Oral, Daily    busPIRone (BUSPAR) 5 mg, Oral, 2 times daily    ondansetron (ZOFRAN-ODT) 4 mg, Oral, Every 8 hours PRN    oxyCODONE (OXY-IR) 5 mg, Oral, Every 6 hours PRN    sertraline (ZOLOFT) 25 MG tablet Take 1 tablet daily for 14 days, then increase to 2 tablets daily        ALLERGIES:  Review of patient's allergies indicates:  No Known Allergies     RELEVANT LABS/STUDIES:              Lab Results   Component Value Date     WBC 7.20 01/20/2020     HGB 15.2 01/20/2020     HCT 48.2 (H) 01/20/2020     MCV 88 01/20/2020      01/20/2020      BMP            Lab Results   Component Value Date      01/20/2020     K 4.1 01/20/2020      01/20/2020     CO2 31 (H) 01/20/2020     BUN 13 01/20/2020     CREATININE 0.8 01/20/2020     CALCIUM 10.9 (H) 01/20/2020     ANIONGAP 9 01/20/2020     ESTGFRAFRICA SEE COMMENT 01/20/2020     EGFRNONAA SEE COMMENT 01/20/2020                Lab Results   Component Value Date     ALT 25 01/20/2020     AST 30 01/20/2020     ALKPHOS 116 01/20/2020     BILITOT 0.3 01/20/2020                Lab Results   Component Value Date     TSH 1.594 08/28/2018                Lab Results   Component Value Date     HGBA1C 5.0 01/20/2020          Constitutional  Vitals:  Most recent vital signs, dated less than 90 days prior to this appointment, were reviewed.    Vitals:    02/14/24 0946   BP: 132/60   Pulse: 68             PHYSICAL EXAM  General: well developed, well nourished  Neurologic:   Gait: Normal   Psychomotor signs:  No involuntary movements or tremor  AIMS: none     PSYCHIATRIC EXAM:     Mental Status Exam:  Appearance: unremarkable, age appropriate  Behavior/Cooperation: normal, cooperative  Speech: normal tone, normal rate, normal pitch, normal volume  Language: uses words appropriately; NO aphasia or dysarthria  Mood: steady  Affect: full, congruent with mood and appropriate to content/situation  Thought Process: normal and logical  Thought Content: normal, no suicidality, no homicidality, delusions, or paranoia  Level of Consciousness: Alert and Oriented x3  Memory:  Intact  Attention/concentration: appropriate for age/education.   Fund of Knowledge: appears adequate  Insight: Intact  Judgment: Intact      Assessment and Diagnosis   Status/Progress: Based on the examination today, the patient's problem(s) is/are well controlled. Co-morbidities and Diagnostic uncertainty are complicating management of the primary condition. R/o MDD with psychotic features vs bipolar disorder.     General Impression:   Mood Disorder NOS  CHRIS  Bulimia Nervosa, in remission       Intervention/Counseling/Treatment Plan   Medication Management: Continue Abilify 5 mg by mouth once daily, start zoloft 25mg daily x 14 days, then increase to 50mg daily  Labs: reviewed most recent  The treatment plan and follow up plan were reviewed with the patient.  Discussed with patient informed consent, risks vs. benefits, alternative treatments, side effect profile and the inherent unpredictability of individual responses to these treatments. The patient expresses understanding of the above and displays the capacity to agree with this current plan and had no other  questions.  Encouraged Patient to keep future appointments.   Take medications as prescribed and abstain from substance abuse.   In the event of an emergency patient was advised to go to the emergency room.     Return to Clinic: 1 month     Ramo Rosenberg MD  [facility] PGY-III

## 2024-03-11 ENCOUNTER — OFFICE VISIT (OUTPATIENT)
Dept: INTERNAL MEDICINE | Facility: CLINIC | Age: 24
End: 2024-03-11
Payer: COMMERCIAL

## 2024-03-11 ENCOUNTER — HOSPITAL ENCOUNTER (OUTPATIENT)
Dept: RADIOLOGY | Facility: HOSPITAL | Age: 24
Discharge: HOME | End: 2024-03-11
Attending: INTERNAL MEDICINE
Payer: COMMERCIAL

## 2024-03-11 VITALS
HEART RATE: 63 BPM | HEIGHT: 63 IN | SYSTOLIC BLOOD PRESSURE: 114 MMHG | WEIGHT: 115.5 LBS | BODY MASS INDEX: 20.46 KG/M2 | DIASTOLIC BLOOD PRESSURE: 60 MMHG | OXYGEN SATURATION: 99 %

## 2024-03-11 DIAGNOSIS — M25.562 CHRONIC PAIN OF LEFT KNEE: ICD-10-CM

## 2024-03-11 DIAGNOSIS — G89.29 CHRONIC PAIN OF LEFT KNEE: ICD-10-CM

## 2024-03-11 DIAGNOSIS — Z00.00 ANNUAL PHYSICAL EXAM: Primary | ICD-10-CM

## 2024-03-11 DIAGNOSIS — M25.60 STIFFNESS IN JOINT: ICD-10-CM

## 2024-03-11 DIAGNOSIS — F31.60 BIPOLAR 1 DISORDER, MIXED: ICD-10-CM

## 2024-03-11 PROCEDURE — 99395 PREV VISIT EST AGE 18-39: CPT | Mod: S$GLB,,, | Performed by: INTERNAL MEDICINE

## 2024-03-11 PROCEDURE — 99999 PR PBB SHADOW E&M-EST. PATIENT-LVL III: CPT | Mod: PBBFAC,,, | Performed by: INTERNAL MEDICINE

## 2024-03-11 PROCEDURE — 1160F RVW MEDS BY RX/DR IN RCRD: CPT | Mod: CPTII,S$GLB,, | Performed by: INTERNAL MEDICINE

## 2024-03-11 PROCEDURE — 3074F SYST BP LT 130 MM HG: CPT | Mod: CPTII,S$GLB,, | Performed by: INTERNAL MEDICINE

## 2024-03-11 PROCEDURE — 3078F DIAST BP <80 MM HG: CPT | Mod: CPTII,S$GLB,, | Performed by: INTERNAL MEDICINE

## 2024-03-11 PROCEDURE — 73562 X-RAY EXAM OF KNEE 3: CPT | Mod: 26,LT,, | Performed by: RADIOLOGY

## 2024-03-11 PROCEDURE — 1159F MED LIST DOCD IN RCRD: CPT | Mod: CPTII,S$GLB,, | Performed by: INTERNAL MEDICINE

## 2024-03-11 PROCEDURE — 3008F BODY MASS INDEX DOCD: CPT | Mod: CPTII,S$GLB,, | Performed by: INTERNAL MEDICINE

## 2024-03-11 PROCEDURE — 73562 X-RAY EXAM OF KNEE 3: CPT | Mod: TC,LT

## 2024-03-11 RX ORDER — BACLOFEN 5 MG/1
5 TABLET ORAL 3 TIMES DAILY PRN
Qty: 90 TABLET | Refills: 0 | Status: SHIPPED | OUTPATIENT
Start: 2024-03-11 | End: 2024-04-10

## 2024-03-11 NOTE — PROGRESS NOTES
"    CHIEF COMPLAINT     Chief Complaint   Patient presents with    Annual Exam       HPI     Blayne Dai is a 23 y.o. male Bipolar 1 here today for annual    Student at [employer]   Since last visit, had appendicitis s/p appy.    Seen by psych. Started on zoloft in addition to abilify found helpful.    Left knee pain.  Reports pain along the lateral side was left knee.  Does not run because of it.  Feels like walking flat is worsened walking upstairs.  Feels like this is all the time nothing made it better or worse.    .  Bilateral trapezius muscles.    Personally Reviewed Patient's Medical, surgical, family and social hx. Changes updated in [employer].  Care Team updated in Epic    Review of Systems:  Review of Systems    Health Maintenance:   Reviewed with patient  Due for the following:      PHYSICAL EXAM     /60 (BP Location: Left arm, Patient Position: Sitting, BP Method: Medium (Manual))   Pulse 63   Ht 5' 3" (1.6 m)   Wt 52.4 kg (115 lb 8.3 oz)   SpO2 99%   BMI 20.46 kg/m²     Gen: Well Appearing, NAD  HEENT: PERR, EOMI  Neck: FROM, no thyromegaly, no cervical adenopathy  CVD: RRR, no M/R/G  Pulm: Normal work of breathing, CTAB, no wheezing  Abd:  Soft, NT, ND non TTP, no mass  MSK: no LE edema  Tender to palpation left knee lateral joint line  Neuro: A&Ox3, gait normal, speech normal  Mood; Mood normal, behavior normal, thought process linear       LABS     Labs reviewed; Notable for    ASSESSMENT     1. Annual physical exam  CBC Auto Differential    Comprehensive Metabolic Panel    Hemoglobin A1C    Lipid Panel      2. Bipolar 1 disorder, mixed        3. Stiffness in joint  baclofen (LIORESAL) 5 mg Tab tablet      4. Chronic pain of left knee  X-Ray Knee 3 View Left              Plan     Blayne Dai is a 23 y.o. male with  1. Annual physical exam  Updated problem list, medical history, care team and discussed HM.     - CBC Auto Differential; Future  - Comprehensive Metabolic Panel; Future  - Hemoglobin " A1C; Future  - Lipid Panel; Future    2. Bipolar 1 disorder, mixed  Followed by Psychiatry continue Abilify 5 mg and Zoloft 50    3. Stiffness in joint  Appears combination of myofascial tightness in his trapezius muscles.  Also with some localizing lateral knee pain.  - baclofen (LIORESAL) 5 mg Tab tablet; Take 1 tablet (5 mg total) by mouth 3 (three) times daily as needed (neck tightness).  Dispense: 90 tablet; Refill: 0    4. Chronic pain of left knee  Unclear cause of pain pain does localize the left lateral joint line.  Will get x-ray.  Given [facility] knee conditioning program.  - X-Ray Knee 3 View Left; Future    F/u 1 year    Pietro Stinson MD

## 2024-03-27 ENCOUNTER — OFFICE VISIT (OUTPATIENT)
Dept: PSYCHIATRY | Facility: CLINIC | Age: 24
End: 2024-03-27
Payer: COMMERCIAL

## 2024-03-27 DIAGNOSIS — F39 MOOD DISORDER: Primary | ICD-10-CM

## 2024-03-27 DIAGNOSIS — F41.1 GAD (GENERALIZED ANXIETY DISORDER): ICD-10-CM

## 2024-03-27 DIAGNOSIS — F50.2 BULIMIA NERVOSA: ICD-10-CM

## 2024-03-27 PROCEDURE — 3044F HG A1C LEVEL LT 7.0%: CPT | Mod: CPTII,95,, | Performed by: STUDENT IN AN ORGANIZED HEALTH CARE EDUCATION/TRAINING PROGRAM

## 2024-03-27 PROCEDURE — 99214 OFFICE O/P EST MOD 30 MIN: CPT | Mod: 95,,, | Performed by: STUDENT IN AN ORGANIZED HEALTH CARE EDUCATION/TRAINING PROGRAM

## 2024-03-27 RX ORDER — SERTRALINE HYDROCHLORIDE 50 MG/1
50 TABLET, FILM COATED ORAL DAILY
Qty: 30 TABLET | Refills: 2 | Status: SHIPPED | OUTPATIENT
Start: 2024-03-27 | End: 2024-06-25

## 2024-03-27 RX ORDER — ARIPIPRAZOLE 5 MG/1
5 TABLET ORAL DAILY
Qty: 30 TABLET | Refills: 2 | Status: SHIPPED | OUTPATIENT
Start: 2024-03-27 | End: 2024-06-25

## 2024-03-27 NOTE — PROGRESS NOTES
10/7/2022 7:30 AM   Blayne Dai   2000   [id number]                                                                   OUTPATIENT PSYCHIATRY FOLLOW- UP VISIT     Reason for Encounter:  Blayne Dai, a 21 y.o. male,who presents today for follow up of anxiety, depression, hypomanic episodes. Met with patient.    The patient location is: in student housing, alone  The chief complaint leading to consultation is: mood, anxiety    Visit type: audiovisual    Face to Face time with patient: 7 minutes  19 minutes of total time spent on the encounter, which includes face to face time and non-face to face time preparing to see the patient (eg, review of tests), Obtaining and/or reviewing separately obtained history, Documenting clinical information in the electronic or other health record, Independently interpreting results (not separately reported) and communicating results to the patient/family/caregiver, or Care coordination (not separately reported).     Each patient to whom he or she provides medical services by telemedicine is:  (1) informed of the relationship between the physician and patient and the respective role of any other health care provider with respect to management of the patient; and (2) notified that he or she may decline to receive medical services by telemedicine and may withdraw from such care at any time.       Interval History and Content of Current Session:  Doing well since last visit. Wanted to follow-up before medication ran out. Looking to potentially to make some changes. Takes abilify, finds it helpful. Has not found buspar to be as effective for anxiety.     Doing a lot better lately. No adverse effects with medications currently. School has been busy lately, but has noticed feeling a lot of improvement in anxiety. Sleep has been good during interval, sleeping a little more lately. Has been seeing PCP for joint pain after appendectomy. Appetite has been good as well.    Psychosocial  stressors: academic, family, social pressures        Review Of Systems:      Medical Review Of Systems:  Pertinent items are noted in HPI.     Psychiatric Review Of Systems:  Sleep: no  appetite changes: no  weight changes: denies  energy/anergy: no  interest/pleasure/anhedonia: no  somatic symptoms: no  libido: no  anxiety/panic: no  guilty/hopeless: no  S.I.B.s/risky behavior: no  any drugs: no  alcohol: no     Risk Parameters:  Patient reports no suicidal ideation  Patient reports no homicidal ideation  Patient reports no self-injurious behavior  Patient reports no violent behavior        Current meds-  abilify     Compliance: yes     Side effects: None     Objective      ALL MEDICATIONS:     Current Outpatient Medications   Medication Instructions    ARIPiprazole (ABILIFY) 5 mg, Oral, Daily    ARIPiprazole (ABILIFY) 5 mg, Oral, Daily    baclofen (LIORESAL) 5 mg, Oral, 3 times daily PRN    sertraline (ZOLOFT) 25 MG tablet Take 1 tablet daily for 14 days, then increase to 2 tablets daily    sertraline (ZOLOFT) 50 mg, Oral, Daily        ALLERGIES:  Review of patient's allergies indicates:  No Known Allergies     RELEVANT LABS/STUDIES:              Lab Results   Component Value Date     WBC 7.20 01/20/2020     HGB 15.2 01/20/2020     HCT 48.2 (H) 01/20/2020     MCV 88 01/20/2020      01/20/2020      BMP            Lab Results   Component Value Date      01/20/2020     K 4.1 01/20/2020      01/20/2020     CO2 31 (H) 01/20/2020     BUN 13 01/20/2020     CREATININE 0.8 01/20/2020     CALCIUM 10.9 (H) 01/20/2020     ANIONGAP 9 01/20/2020     ESTGFRAFRICA SEE COMMENT 01/20/2020     EGFRNONAA SEE COMMENT 01/20/2020                Lab Results   Component Value Date     ALT 25 01/20/2020     AST 30 01/20/2020     ALKPHOS 116 01/20/2020     BILITOT 0.3 01/20/2020                Lab Results   Component Value Date     TSH 1.594 08/28/2018                Lab Results   Component Value Date     HGBA1C 5.0  01/20/2020         Constitutional  Vitals:  Most recent vital signs, dated less than 90 days prior to this appointment, were reviewed.    There were no vitals filed for this visit.            PHYSICAL EXAM  General: well developed, well nourished  Neurologic:   Gait: Normal   Psychomotor signs:  No involuntary movements or tremor  AIMS: none     PSYCHIATRIC EXAM:     Mental Status Exam:  Appearance: unremarkable, age appropriate  Behavior/Cooperation: normal, cooperative  Speech: normal tone, normal rate, normal pitch, normal volume  Language: uses words appropriately; NO aphasia or dysarthria  Mood: steady  Affect: full, congruent with mood and appropriate to content/situation  Thought Process: normal and logical  Thought Content: normal, no suicidality, no homicidality, delusions, or paranoia  Level of Consciousness: Alert and Oriented x3  Memory:  Intact  Attention/concentration: appropriate for age/education.   Fund of Knowledge: appears adequate  Insight: Intact  Judgment: Intact      Assessment and Diagnosis   Status/Progress: Based on the examination today, the patient's problem(s) is/are well controlled. Co-morbidities and Diagnostic uncertainty are complicating management of the primary condition. R/o MDD with psychotic features vs bipolar disorder.     General Impression:   Mood Disorder NOS  CHRIS  Bulimia Nervosa, in remission       Intervention/Counseling/Treatment Plan   Medication Management: Continue Abilify 5 mg by mouth once daily, continue zoloft 50mg daily  Discussed resident transition  Labs: reviewed most recent  The treatment plan and follow up plan were reviewed with the patient.  Discussed with patient informed consent, risks vs. benefits, alternative treatments, side effect profile and the inherent unpredictability of individual responses to these treatments. The patient expresses understanding of the above and displays the capacity to agree with this current plan and had no other  questions.  Encouraged Patient to keep future appointments.   Take medications as prescribed and abstain from substance abuse.   In the event of an emergency patient was advised to go to the emergency room.     Return to Clinic: 3 months or sooner if needed     Ramo Rosenberg MD  [facility] PGY-III

## 2024-06-10 ENCOUNTER — PATIENT MESSAGE (OUTPATIENT)
Dept: INTERNAL MEDICINE | Facility: CLINIC | Age: 24
End: 2024-06-10
Payer: COMMERCIAL

## 2024-06-24 RX ORDER — SERTRALINE HYDROCHLORIDE 50 MG/1
50 TABLET, FILM COATED ORAL DAILY
Qty: 30 TABLET | Refills: 2 | Status: SHIPPED | OUTPATIENT
Start: 2024-06-24 | End: 2024-09-22

## (undated) DEVICE — TROCAR ENDOPATH XCEL 12X100MM

## (undated) DEVICE — DRAPE OPTIMA MAJOR PEDIATRIC

## (undated) DEVICE — SUT MONOCRYL 5-0 P-3 UND 18

## (undated) DEVICE — ADHESIVE DERMABOND ADVANCED

## (undated) DEVICE — SUT MONOCRYL 4-0 PS-2

## (undated) DEVICE — ELECTRODE NEEDLE 2.8IN

## (undated) DEVICE — KIT WING PAD POSITIONING

## (undated) DEVICE — TROCAR ENDOPATH XCEL 5X100MM

## (undated) DEVICE — RELOAD ECHELON ENDOPATH 45MM

## (undated) DEVICE — PENCIL ELECTROSURG HOLST W/BLD

## (undated) DEVICE — NDL INSUFFLATION VERRES 120MM

## (undated) DEVICE — TRAY MINOR GEN SURG

## (undated) DEVICE — TRAY CATH 1-LYR URIMTR 16FR

## (undated) DEVICE — Device

## (undated) DEVICE — ELECTRODE REM PLYHSV RETURN 9

## (undated) DEVICE — STAPLER ECHELON PWR 45MM

## (undated) DEVICE — SYR 10CC LUER LOCK

## (undated) DEVICE — GOWN SURGICAL X-LARGE

## (undated) DEVICE — DRESSING EYE OVAL LF

## (undated) DEVICE — SEE MEDLINE ITEM 157117

## (undated) DEVICE — DRESSING MEPILEX BORDER 4 X 4

## (undated) DEVICE — SUT 3-0 VICRYL / RB-1

## (undated) DEVICE — STAPLER ECHELON FLEX GST 45MM

## (undated) DEVICE — SUT MCRYL PLUS 4-0 PS2 27IN

## (undated) DEVICE — SEE MEDLINE ITEM 154981

## (undated) DEVICE — GAUZE SPONGE PEANUT STRL

## (undated) DEVICE — SYS SEE SHARP SCP ANTIFG LNG

## (undated) DEVICE — NDL HYPO REG 25G X 1 1/2

## (undated) DEVICE — SUT VICRYL+ 27 UR-6 VIOL

## (undated) DEVICE — SOL POVIDONE SCRUB IODINE 4 OZ

## (undated) DEVICE — GLOVE BIOGEL 7.5

## (undated) DEVICE — BAG TISS RETRV MONARCH 10MM